# Patient Record
Sex: FEMALE | Race: ASIAN | NOT HISPANIC OR LATINO | Employment: PART TIME | URBAN - METROPOLITAN AREA
[De-identification: names, ages, dates, MRNs, and addresses within clinical notes are randomized per-mention and may not be internally consistent; named-entity substitution may affect disease eponyms.]

---

## 2019-12-09 ENCOUNTER — OFFICE VISIT (OUTPATIENT)
Dept: OPTOMETRY | Facility: CLINIC | Age: 64
End: 2019-12-09
Payer: COMMERCIAL

## 2019-12-09 DIAGNOSIS — Z96.1 PSEUDOPHAKIA: ICD-10-CM

## 2019-12-09 DIAGNOSIS — H25.12 NUCLEAR SCLEROTIC CATARACT OF LEFT EYE: Primary | ICD-10-CM

## 2019-12-09 PROCEDURE — 92002 INTRM OPH EXAM NEW PATIENT: CPT | Performed by: OPTOMETRIST

## 2019-12-09 ASSESSMENT — VISUAL ACUITY
OD_SC: 20/30
OS_SC: HM
OD_SC+: +2
METHOD: TUMBLING E 'S
OS_PH_SC: 20/400

## 2019-12-09 ASSESSMENT — SLIT LAMP EXAM - LIDS
COMMENTS: NORMAL
COMMENTS: NORMAL

## 2019-12-09 ASSESSMENT — EXTERNAL EXAM - RIGHT EYE: OD_EXAM: NORMAL

## 2019-12-09 ASSESSMENT — EXTERNAL EXAM - LEFT EYE: OS_EXAM: NORMAL

## 2019-12-09 ASSESSMENT — TONOMETRY
OS_IOP_MMHG: 16
IOP_METHOD: TONOPEN
OD_IOP_MMHG: 16

## 2019-12-09 ASSESSMENT — REFRACTION_MANIFEST: OD_SPHERE: NO READING

## 2019-12-09 NOTE — PROGRESS NOTES
Chief Complaint   Patient presents with     Cloudy Vision Left Eye     HPI    Cloudy Vision Left Eye      In left eye. Onset was gradual. This started 6 months ago. Severity is moderate. Occurring constantly. It is worse throughout the day. Context: distance vision and near vision. Since onset it is gradually worsening. Treatments tried include no treatments.       Cataract surgery right eye 03/2019.  In [place]    Iliana Kelley CPO    See Review Of Systems       Medical, surgical and family histories reviewed and updated 12/9/2019.         OBJECTIVE: See Ophthalmology exam    ASSESSMENT:    ICD-10-CM    1. Nuclear sclerotic cataract of left eye H25.12    2. Pseudophakia - Right Eye Z96.1     dense cataract unable to view post segment     PLAN:  Refer for CE OS  Cristy Mills OD

## 2019-12-09 NOTE — LETTER
12/9/2019         RE: Ping Guerrero  [address] [address] [facility] [address]        Dear Colleague,    Thank you for referring your patient, Ping Guerrero, to [facility]. Please see a copy of my visit note below.    Chief Complaint   Patient presents with     Cloudy Vision Left Eye     HPI    Cloudy Vision Left Eye      In left eye. Onset was gradual. This started 6 months ago. Severity is moderate. Occurring constantly. It is worse throughout the day. Context: distance vision and near vision. Since onset it is gradually worsening. Treatments tried include no treatments.       Cataract surgery right eye 03/2019.  In [place] CPO    See Review Of Systems       Medical, surgical and family histories reviewed and updated 12/9/2019.         OBJECTIVE: See Ophthalmology exam    ASSESSMENT:    ICD-10-CM    1. Nuclear sclerotic cataract of left eye H25.12    2. Pseudophakia - Right Eye Z96.1     dense cataract unable to view post segment     PLAN:  Refer for CE OS  Cristy Mills OD     Again, thank you for allowing me to participate in the care of your patient.        Sincerely,        Cristy Mills, OD

## 2020-01-13 ENCOUNTER — TELEPHONE (OUTPATIENT)
Dept: FAMILY MEDICINE | Facility: CLINIC | Age: 65
End: 2020-01-13

## 2020-01-13 ENCOUNTER — OFFICE VISIT (OUTPATIENT)
Dept: FAMILY MEDICINE | Facility: CLINIC | Age: 65
End: 2020-01-13
Payer: COMMERCIAL

## 2020-01-13 VITALS
SYSTOLIC BLOOD PRESSURE: 120 MMHG | TEMPERATURE: 98.4 F | DIASTOLIC BLOOD PRESSURE: 77 MMHG | BODY MASS INDEX: 27.42 KG/M2 | WEIGHT: 136 LBS | OXYGEN SATURATION: 95 % | HEIGHT: 59 IN | HEART RATE: 40 BPM

## 2020-01-13 DIAGNOSIS — H26.9 CATARACT OF LEFT EYE, UNSPECIFIED CATARACT TYPE: ICD-10-CM

## 2020-01-13 DIAGNOSIS — Z01.818 PREOP GENERAL PHYSICAL EXAM: Primary | ICD-10-CM

## 2020-01-13 PROCEDURE — 99204 OFFICE O/P NEW MOD 45 MIN: CPT | Performed by: FAMILY MEDICINE

## 2020-01-13 SDOH — HEALTH STABILITY: MENTAL HEALTH: HOW OFTEN DO YOU HAVE A DRINK CONTAINING ALCOHOL?: NEVER

## 2020-01-13 ASSESSMENT — MIFFLIN-ST. JEOR: SCORE: 1077.52

## 2020-01-13 NOTE — TELEPHONE ENCOUNTER
Pre op information was faxed to [facility] [place] for Dr. Foote.  Elpidio Ann CMA

## 2020-01-13 NOTE — PROGRESS NOTES
[facility]  [id number] [place] [id number]  [phone]  Dept: [phone]    PRE-OP EVALUATION:  Today's date: 2020    Ping Guerrero (: 1955) presents for pre-operative evaluation assessment as requested by Dr. Foote.  She requires evaluation and anesthesia risk assessment prior to undergoing surgery/procedure for treatment of left eye cataract .    Fax number for surgical facility: [facility]  Primary Physician: No Ref-Primary, Physician  Type of Anesthesia Anticipated: Local with MAC    Patient has a Health Care Directive or Living Will:  NO    Preop Questions 2020   Who is doing your surgery? Mario   What are you having done? Cataract   Date of Surgery/Procedure: 2020   Facility or Hospital where procedure/surgery will be performed: [facility]    1.  Do you have a history of Heart attack, stroke, stent, coronary bypass surgery, or other heart surgery? No   2.  Do you ever have any pain or discomfort in your chest? No   3.  Do you have a history of  Heart Failure? No   4.   Are you troubled by shortness of breath when:  walking on a level surface, or up a slight hill, or at night? No   5.  Do you currently have a cold, bronchitis or other respiratory infection? No   6.  Do you have a cough, shortness of breath, or wheezing? No   7.  Do you sometimes get pains in the calves of your legs when you walk? No   8. Do you or anyone in your family have previous history of blood clots? No   9.  Do you or does anyone in your family have a serious bleeding problem such as prolonged bleeding following surgeries or cuts? No   10. Have you ever had problems with anemia or been told to take iron pills? No   11. Have you had any abnormal blood loss such as black, tarry or bloody stools, or abnormal vaginal bleeding? No   12. Have you ever had a blood transfusion? No   13. Have you or any of your relatives ever had problems with anesthesia? No   14.  "Do you have sleep apnea, excessive snoring or daytime drowsiness? No   15. Do you have any prosthetic heart valves? No   16. Do you have prosthetic joints? No   17. Is there any chance that you may be pregnant? No         HPI:     HPI related to upcoming procedure: Left Eye cataract. Extraction deemed the next best possible management in care.   No acute complaints today.         See problem list for active medical problems.  Problems all longstanding and stable, except as noted/documented.  See ROS for pertinent symptoms related to these conditions.      MEDICAL HISTORY:   There are no active problems to display for this patient.     Past Medical History:   Diagnosis Date     Nonsenile cataract 03/2019    Right eye only     Past Surgical History:   Procedure Laterality Date     HC ECP WITH CATARACT SURGERY Right 03/15/2019    In [place]     No current outpatient medications on file.     OTC products: no recent use of OTC ASA, NSAIDS or Steroids    No Known Allergies   Latex Allergy: NO    Social History     Tobacco Use     Smoking status: Never Smoker     Smokeless tobacco: Never Used   Substance Use Topics     Alcohol use: Never     Frequency: Never     History   Drug Use Unknown       REVIEW OF SYSTEMS:   Constitutional, neuro, ENT, endocrine, pulmonary, cardiac, gastrointestinal, genitourinary, musculoskeletal, integument and psychiatric systems are negative, except as otherwise noted.    EXAM:   /77 (BP Location: Right arm, Patient Position: Chair, Cuff Size: Adult Regular)   Pulse (!) 40   Temp 98.4  F (36.9  C) (Oral)   Ht 1.499 m (4' 11\")   Wt 61.7 kg (136 lb)   SpO2 95%   BMI 27.47 kg/m      GENERAL APPEARANCE: healthy, alert and no distress     EYES: EOMI, PERRL     HENT: ear canals and TM's normal and nose and mouth without ulcers or lesions     NECK: no adenopathy, no asymmetry, masses, or scars and thyroid normal to palpation     RESP: lungs clear to auscultation - no rales, rhonchi or " wheezes     CV: regular rates and rhythm, normal S1 S2, no S3 or S4 and no murmur, click or rub     ABDOMEN:  soft, nontender, no HSM or masses and bowel sounds normal     MS: extremities normal- no gross deformities noted, no evidence of inflammation in joints, FROM in all extremities.     SKIN: no suspicious lesions or rashes     NEURO: Normal strength and tone, sensory exam grossly normal, mentation intact and speech normal     PSYCH: mentation appears normal. and affect normal/bright     LYMPHATICS: No cervical adenopathy    DIAGNOSTICS:   No labs or EKG required for low risk surgery (cataract, skin procedure, breast biopsy, etc)    No results for input(s): HGB, PLT, INR, NA, POTASSIUM, CR, A1C in the last 28376 hours.     IMPRESSION:   Reason for surgery/procedure: Left eye cataract   Diagnosis/reason for consult: Pre-operative consult     The proposed surgical procedure is considered LOW risk.    REVISED CARDIAC RISK INDEX  The patient has the following serious cardiovascular risks for perioperative complications such as (MI, PE, VFib and 3  AV Block):  No serious cardiac risks  INTERPRETATION: 0 risks: Class I (very low risk - 0.4% complication rate)    The patient has the following additional risks for perioperative complications:  No identified additional risks      ICD-10-CM    1. Preop general physical exam Z01.818    2. Cataract of left eye, unspecified cataract type H26.9        RECOMMENDATIONS:       APPROVAL GIVEN to proceed with proposed procedure, without further diagnostic evaluation       Signed Electronically by: Ashely Fregoso MD    Copy of this evaluation report is provided to requesting physician.    [place] Preop Guidelines    Revised Cardiac Risk Index

## 2020-01-13 NOTE — TELEPHONE ENCOUNTER
Reason for Call:  Form, our goal is to have forms completed with 72 hours, however, some forms may require a visit or additional information.    Type of letter, form or note:  Pre-Op Form    Who is the form from?: Patient    Where did the form come from: Patient or family brought in       What clinic location was the form placed at?: [facility]     Where the form was placed: Folder Box/Folder    What number is listed as a contact on the form?:[phone]        Additional comments: Daughter will  nce called to notify of completion    Call taken on 1/13/2020 at 2:33 PM by Jorge Luis Villar

## 2020-02-19 ENCOUNTER — TELEPHONE (OUTPATIENT)
Dept: FAMILY MEDICINE | Facility: CLINIC | Age: 65
End: 2020-02-19

## 2020-02-19 NOTE — TELEPHONE ENCOUNTER
Panel Management Review      Patient has the following on her problem list: None      Composite cancer screening  Chart review shows that this patient is due/due soon for the following Mammogram  Summary:    Patient is due/failing the following:   MAMMOGRAM    Action needed:   Patient needs office visit for Mammo.    Type of outreach:    none. pt having up coming appointment.    Questions for provider review:    None                                                                                                                                    Betty Jimenez MA  [facility].         Chart routed to none .

## 2020-06-12 ENCOUNTER — TELEPHONE (OUTPATIENT)
Dept: FAMILY MEDICINE | Facility: CLINIC | Age: 65
End: 2020-06-12

## 2020-06-12 NOTE — LETTER
[facility]  [address] [address] [facility] [address]  [phone]  June 12, 2020    Ping Guerrero  [address] [address] [address]    Dear Ping,    I care about your health and have reviewed your health plan. I have reviewed your medical conditions, medication list, and lab results and am making recommendations based on this review, to better manage your health.    You are in particular need of attention regarding:  -Breast Cancer Screening  -Colon Cancer Screening  -Cervical Cancer Screening    I am recommending that you:  {recommendations:-schedule a MAMMOGRAM which is due. We have mammogram available at our clinic; please call [phone].   Please disregard this reminder if you have had this exam elsewhere within the last year.  It would be helpful for us to have a copy of your mammogram report in our file so that we can best coordinate your care.  -schedule a COLONOSCOPY to look for colon cancer (due every 10 years or 5 years in higher risk situations.)   Colon cancer is now the second leading cause of death in the [place] for both men and women and there are over 130,000 new cases and 50,000 deaths per year from colon cancer.  Colonoscopies can prevent 90-95% of these deaths.  Problem lesions can be removed before they ever become cancer.  This test is not only looking for cancer, but also getting rid of precancerious lesions.  If you do not wish to do a colonoscopy or cannot afford to do one, at this time, there is another option. It is called a FIT test or Fecal Immunochemical Occult Blood Test (take home stool sample kit).  It does not replace the colonoscopy for colorectal cancer screening, but it can detect hidden bleeding in the lower colon.  It does need to be repeated every year and if a positive result is obtained, you would be referred for a colonoscopy.  If you have completed either one of these tests at another facility, please  have the records sent to our clinic so that we can best coordinate your care.  -schedule a PAP SMEAR EXAM which is due.  Please disregard this reminder if you have had this exam elsewhere within the last year.  It would be helpful for us to have a copy of your recent pap smear report in our file so that we can best coordinate your care.    Here is a list of Health Maintenance topics that are due now or due soon:  Health Maintenance Due   Topic Date Due     PREVENTIVE CARE VISIT  1955     HEPATITIS C SCREENING  1955     ANNUAL REVIEW OF HM ORDERS  1955     ADVANCE CARE PLANNING  1955     MAMMO SCREENING  1955     COLORECTAL CANCER SCREENING  11/09/1966     HIV SCREENING  11/09/1971     PAP  11/09/1977     DTAP/TDAP/TD IMMUNIZATION (1 - Tdap) 11/09/1981     LIPID  11/09/2001     PHQ-2  01/01/2020     ZOSTER IMMUNIZATION (2 of 2) 04/06/2020       Please call us at [phone] (or use [employer]) to address the above recommendations.     Thank you for trusting [facility] and we appreciate the opportunity to serve you.  We look forward to supporting your healthcare needs in the future.    Healthy Regards,    Ashely Fregoso MD/lf

## 2020-06-12 NOTE — TELEPHONE ENCOUNTER
Summary:    Patient is due/failing the following:   MAMMOGRAM    Reviewed:  [x] CARE EVERYWHERE  [x] LAST OV NOTE INCLUDING ENDO  [x] FYI TAB  [x] MYCHART ACTIVE?  [x] LAST PANEL ENCOUNTER  [x] FUTURE APPTS  [x] IMMUNIZATIONS          Action needed:   mammo    Type of outreach:    Sent letter.                                                                               Toshia Smith/CHANTELLE  Gepp---[facility]

## 2020-09-14 ENCOUNTER — HOSPITAL ENCOUNTER (OUTPATIENT)
Dept: MAMMOGRAPHY | Facility: CLINIC | Age: 65
Discharge: HOME | End: 2020-09-14
Attending: FAMILY MEDICINE | Admitting: FAMILY MEDICINE
Payer: COMMERCIAL

## 2020-09-14 PROCEDURE — 77067 SCR MAMMO BI INCL CAD: CPT

## 2020-09-21 ENCOUNTER — HOSPITAL ENCOUNTER (OUTPATIENT)
Dept: ULTRASOUND IMAGING | Facility: CLINIC | Age: 65
End: 2020-09-21
Attending: FAMILY MEDICINE
Payer: COMMERCIAL

## 2020-09-21 ENCOUNTER — HOSPITAL ENCOUNTER (OUTPATIENT)
Dept: MAMMOGRAPHY | Facility: CLINIC | Age: 65
End: 2020-09-21
Attending: FAMILY MEDICINE
Payer: COMMERCIAL

## 2020-09-21 DIAGNOSIS — R92.8 ABNORMAL MAMMOGRAM: ICD-10-CM

## 2020-09-21 PROCEDURE — 76642 ULTRASOUND BREAST LIMITED: CPT | Mod: RT

## 2020-09-21 PROCEDURE — G0279 TOMOSYNTHESIS, MAMMO: HCPCS

## 2020-09-24 ENCOUNTER — OFFICE VISIT (OUTPATIENT)
Dept: FAMILY MEDICINE | Facility: CLINIC | Age: 65
End: 2020-09-24
Payer: COMMERCIAL

## 2020-09-24 VITALS
SYSTOLIC BLOOD PRESSURE: 118 MMHG | BODY MASS INDEX: 28.27 KG/M2 | RESPIRATION RATE: 16 BRPM | OXYGEN SATURATION: 96 % | HEART RATE: 74 BPM | HEIGHT: 60 IN | TEMPERATURE: 98.2 F | DIASTOLIC BLOOD PRESSURE: 78 MMHG | WEIGHT: 144 LBS

## 2020-09-24 DIAGNOSIS — Z12.4 SCREENING FOR MALIGNANT NEOPLASM OF CERVIX: ICD-10-CM

## 2020-09-24 DIAGNOSIS — R82.90 ABNORMAL URINE ODOR: ICD-10-CM

## 2020-09-24 DIAGNOSIS — Z00.00 ROUTINE GENERAL MEDICAL EXAMINATION AT A HEALTH CARE FACILITY: Primary | ICD-10-CM

## 2020-09-24 DIAGNOSIS — Z13.6 CARDIOVASCULAR SCREENING; LDL GOAL LESS THAN 160: ICD-10-CM

## 2020-09-24 DIAGNOSIS — R82.90 NONSPECIFIC FINDING ON EXAMINATION OF URINE: ICD-10-CM

## 2020-09-24 DIAGNOSIS — Z11.59 NEED FOR HEPATITIS C SCREENING TEST: ICD-10-CM

## 2020-09-24 DIAGNOSIS — Z23 NEED FOR PROPHYLACTIC VACCINATION AND INOCULATION AGAINST INFLUENZA: ICD-10-CM

## 2020-09-24 DIAGNOSIS — K08.89 TOOTH PAIN: ICD-10-CM

## 2020-09-24 DIAGNOSIS — Z12.11 SPECIAL SCREENING FOR MALIGNANT NEOPLASMS, COLON: ICD-10-CM

## 2020-09-24 LAB
ALBUMIN UR-MCNC: ABNORMAL MG/DL
APPEARANCE UR: CLEAR
BACTERIA #/AREA URNS HPF: ABNORMAL /HPF
BILIRUB UR QL STRIP: NEGATIVE
COLOR UR AUTO: YELLOW
GLUCOSE UR STRIP-MCNC: NEGATIVE MG/DL
HCV AB SERPL QL IA: NONREACTIVE
HGB UR QL STRIP: ABNORMAL
KETONES UR STRIP-MCNC: NEGATIVE MG/DL
LEUKOCYTE ESTERASE UR QL STRIP: ABNORMAL
MUCOUS THREADS #/AREA URNS LPF: PRESENT /LPF
NITRATE UR QL: NEGATIVE
NON-SQ EPI CELLS #/AREA URNS LPF: ABNORMAL /LPF
PH UR STRIP: 6 PH (ref 5–7)
RBC #/AREA URNS AUTO: ABNORMAL /HPF
SOURCE: ABNORMAL
SP GR UR STRIP: 1.02 (ref 1–1.03)
UROBILINOGEN UR STRIP-ACNC: 0.2 EU/DL (ref 0.2–1)
WBC #/AREA URNS AUTO: ABNORMAL /HPF

## 2020-09-24 PROCEDURE — 81001 URINALYSIS AUTO W/SCOPE: CPT | Performed by: NURSE PRACTITIONER

## 2020-09-24 PROCEDURE — 80053 COMPREHEN METABOLIC PANEL: CPT | Performed by: NURSE PRACTITIONER

## 2020-09-24 PROCEDURE — 99213 OFFICE O/P EST LOW 20 MIN: CPT | Mod: 25 | Performed by: NURSE PRACTITIONER

## 2020-09-24 PROCEDURE — 90472 IMMUNIZATION ADMIN EACH ADD: CPT | Performed by: NURSE PRACTITIONER

## 2020-09-24 PROCEDURE — 87086 URINE CULTURE/COLONY COUNT: CPT | Performed by: NURSE PRACTITIONER

## 2020-09-24 PROCEDURE — 87088 URINE BACTERIA CULTURE: CPT | Performed by: NURSE PRACTITIONER

## 2020-09-24 PROCEDURE — 87186 SC STD MICRODIL/AGAR DIL: CPT | Performed by: NURSE PRACTITIONER

## 2020-09-24 PROCEDURE — 99396 PREV VISIT EST AGE 40-64: CPT | Mod: 25 | Performed by: NURSE PRACTITIONER

## 2020-09-24 PROCEDURE — 86803 HEPATITIS C AB TEST: CPT | Performed by: NURSE PRACTITIONER

## 2020-09-24 PROCEDURE — 80061 LIPID PANEL: CPT | Performed by: NURSE PRACTITIONER

## 2020-09-24 PROCEDURE — 90715 TDAP VACCINE 7 YRS/> IM: CPT | Performed by: NURSE PRACTITIONER

## 2020-09-24 PROCEDURE — 90471 IMMUNIZATION ADMIN: CPT | Performed by: NURSE PRACTITIONER

## 2020-09-24 PROCEDURE — 36415 COLL VENOUS BLD VENIPUNCTURE: CPT | Performed by: NURSE PRACTITIONER

## 2020-09-24 PROCEDURE — G0145 SCR C/V CYTO,THINLAYER,RESCR: HCPCS | Performed by: NURSE PRACTITIONER

## 2020-09-24 PROCEDURE — 90682 RIV4 VACC RECOMBINANT DNA IM: CPT | Performed by: NURSE PRACTITIONER

## 2020-09-24 PROCEDURE — G0476 HPV COMBO ASSAY CA SCREEN: HCPCS | Performed by: NURSE PRACTITIONER

## 2020-09-24 ASSESSMENT — ENCOUNTER SYMPTOMS
HEMATOCHEZIA: 0
HEADACHES: 0
SHORTNESS OF BREATH: 0
PARESTHESIAS: 0
DIZZINESS: 1
CHILLS: 0
COUGH: 0
DIARRHEA: 0
DYSURIA: 0
HEMATURIA: 0
PALPITATIONS: 0
NAUSEA: 0
BREAST MASS: 0
WEAKNESS: 0
ARTHRALGIAS: 0
JOINT SWELLING: 0
MYALGIAS: 0
CONSTIPATION: 0
EYE PAIN: 0
SORE THROAT: 0
FREQUENCY: 0
FEVER: 0
HEARTBURN: 0
ABDOMINAL PAIN: 0
NERVOUS/ANXIOUS: 0

## 2020-09-24 ASSESSMENT — MIFFLIN-ST. JEOR: SCORE: 1121.74

## 2020-09-24 NOTE — PROGRESS NOTES
SUBJECTIVE:   CC: Ping Guerrero is an 64 year old woman who presents for preventive health visit.         Patient has been advised of split billing requirements and indicates understanding: Yes  Healthy Habits:     Getting at least 3 servings of Calcium per day:  NO    Bi-annual eye exam:  NO    Dental care twice a year:  NO    Sleep apnea or symptoms of sleep apnea:  None    Diet:  Regular (no restrictions)    Frequency of exercise:  None    Taking medications regularly:  Yes    Medication side effects:  None    PHQ-2 Total Score: 0    Additional concerns today:  No    Ability to successfully perform activities of daily living: Yes, no assistance needed  Home safety:  none identified   Hearing impairment: No          Today's PHQ-2 Score:   PHQ-2 ( 1999 [employer]) 9/24/2020   Q1: Little interest or pleasure in doing things 0   Q2: Feeling down, depressed or hopeless 0   PHQ-2 Score 0   Q1: Little interest or pleasure in doing things Not at all   Q2: Feeling down, depressed or hopeless Not at all   PHQ-2 Score 0       Abuse: Current or Past (Physical, Sexual or Emotional) - No  Do you feel safe in your environment? Yes    Have you ever done Advance Care Planning? (For example, a Health Directive, POLST, or a discussion with a medical provider or your loved ones about your wishes): No, advance care planning information given to patient to review.  Patient declined advance care planning discussion at this time.    Social History     Tobacco Use     Smoking status: Never Smoker     Smokeless tobacco: Never Used   Substance Use Topics     Alcohol use: Never     Frequency: Never         Alcohol Use 9/24/2020   Prescreen: >3 drinks/day or >7 drinks/week? Not Applicable     HPI  Here for physical   Wants to get up to date on routine care.  Mandarin  via telephone utilized.   Patient moved here from [place] May 2019.   Has not been seen medically for over 30 years, per pt.   Eating and drinking okay.   No changes in  bowels.   Has never had a colonoscopy.  Lives with her daughter.  Daughter states her urine has a foul odor at times.   Daughter states she does not exercise.       Reviewed orders with patient.  Reviewed health maintenance and updated orders accordingly - Yes  Lab work is in process    Mammogram Screening: Patient over age 50, mutual decision to screen reflected in health maintenance.    Pertinent mammograms are reviewed under the imaging tab.  History of abnormal Pap smear: NO - age 30- 65 PAP every 5 years recommended     Reviewed and updated as needed this visit by clinical staff  Tobacco  Allergies  Med Hx  Surg Hx  Fam Hx  Soc Hx        Reviewed and updated as needed this visit by Provider        Past Medical History:   Diagnosis Date     Nonsenile cataract 03/2019    Right eye only      Past Surgical History:   Procedure Laterality Date     HC ECP WITH CATARACT SURGERY Right 03/15/2019    In [place]       Review of Systems   Constitutional: Negative for chills and fever.   HENT: Negative for congestion, ear pain, hearing loss and sore throat.    Eyes: Negative for pain and visual disturbance.   Respiratory: Negative for cough and shortness of breath.    Cardiovascular: Negative for chest pain, palpitations and peripheral edema.   Gastrointestinal: Negative for abdominal pain, constipation, diarrhea, heartburn, hematochezia and nausea.   Breasts:  Negative for tenderness, breast mass and discharge.   Genitourinary: Negative for dysuria, frequency, genital sores, hematuria, pelvic pain, urgency, vaginal bleeding and vaginal discharge.   Musculoskeletal: Negative for arthralgias, joint swelling and myalgias.   Skin: Negative for rash.   Neurological: Positive for dizziness. Negative for weakness, headaches and paresthesias.   Psychiatric/Behavioral: Negative for mood changes. The patient is not nervous/anxious.           OBJECTIVE:   /78   Pulse 74   Temp 98.2  F (36.8  C) (Oral)   Resp 16   Ht  "1.511 m (4' 11.5\")   Wt 65.3 kg (144 lb)   SpO2 96%   BMI 28.60 kg/m    Physical Exam  GENERAL APPEARANCE: healthy, alert and no distress  EYES: Eyes grossly normal to inspection, PERRL and conjunctivae and sclerae normal  HENT: ear canals and TM's normal, nose and mouth without ulcers or lesions, oropharynx clear and oral mucous membranes moist  NECK: no adenopathy, no asymmetry, masses, or scars and thyroid normal to palpation  RESP: lungs clear to auscultation - no rales, rhonchi or wheezes  BREAST: normal without masses, tenderness or nipple discharge and no palpable axillary masses or adenopathy  CV: regular rate and rhythm, normal S1 S2, no S3 or S4, no murmur, click or rub, no peripheral edema and peripheral pulses strong  ABDOMEN: soft, nontender, no hepatosplenomegaly, no masses and bowel sounds normal  :  Normal external genitalia, cervix.  NEURO: Normal strength and tone, sensory exam grossly normal, mentation intact and speech normal  PSYCH: mentation appears normal and affect normal/bright    Diagnostic Test Results:  Labs reviewed in Epic    ASSESSMENT/PLAN:   1. Routine general medical examination at a health care facility  Updated pap smear.  Will plan on 3 normal pap smears prior to discontinuing.      2. Special screening for malignant neoplasms, colon  Patient agrees and understands the plan for colonoscopy.   - GASTROENTEROLOGY ADULT REF PROCEDURE ONLY; Future    3. CARDIOVASCULAR SCREENING; LDL GOAL LESS THAN 160  - Comprehensive metabolic panel  - Lipid panel reflex to direct LDL Fasting    4. Screening for malignant neoplasm of cervix  Has not had a pap in the last 30 years.    - Pap imaged thin layer screen with HPV - recommended age 30 - 65 years (select HPV order below)  - HPV High Risk Types DNA Cervical    5. Need for hepatitis C screening test  - **Hepatitis C Screen Reflex to RNA FUTURE anytime    6. Abnormal urine odor  Per daughter, urine has had an odor over the last year. Would " "like to check urine today.   - *UA reflex to Microscopic and Culture ([place] [facility] (except [place] and [place])    7. Need for prophylactic vaccination and inoculation against influenza  - INFLUENZA QUAD, RECOMBINANT, P-FREE (RIV4) (FLUBLOCK) [51640]  - Vaccine Administration, Initial [47493]    Patient has been advised of split billing requirements and indicates understanding: Yes  COUNSELING:  Reviewed preventive health counseling, as reflected in patient instructions       Regular exercise       Dental care    Estimated body mass index is 28.6 kg/m  as calculated from the following:    Height as of this encounter: 1.511 m (4' 11.5\").    Weight as of this encounter: 65.3 kg (144 lb).    Weight management plan: Discussed healthy diet and exercise guidelines    She reports that she has never smoked. She has never used smokeless tobacco.      Counseling Resources:  ATP IV Guidelines  Pooled Cohorts Equation Calculator  Breast Cancer Risk Calculator  BRCA-Related Cancer Risk Assessment: FHS-7 Tool  FRAX Risk Assessment  ICSI Preventive Guidelines  Dietary Guidelines for Americans, 2010  USDA's MyPlate  ASA Prophylaxis  Lung CA Screening    MIRNA Rushing Ra, CNP  [facility] [place]  "

## 2020-09-24 NOTE — LETTER
October 8, 2020      [place]ol[place] Guerrero  [address] [address] [address]        Dear ,    We are happy to inform you that your recent Pap smear and Human Papillomavirus (HPV) test results are normal and negative.    It is recommended that you have your next Pap smear in 3 years. You will also need to return to the clinic every year for an annual wellness visit.    If you have additional questions regarding this result, please contact our office and we will be happy to assist you.      Sincerely,    Your [facility] Care Team

## 2020-09-24 NOTE — PATIENT INSTRUCTIONS

## 2020-09-25 ENCOUNTER — PATIENT OUTREACH (OUTPATIENT)
Dept: CARE COORDINATION | Facility: CLINIC | Age: 65
End: 2020-09-25

## 2020-09-25 LAB
ALBUMIN SERPL-MCNC: 3.7 G/DL (ref 3.4–5)
ALP SERPL-CCNC: 74 U/L (ref 40–150)
ALT SERPL W P-5'-P-CCNC: 12 U/L (ref 0–50)
ANION GAP SERPL CALCULATED.3IONS-SCNC: 10 MMOL/L (ref 3–14)
AST SERPL W P-5'-P-CCNC: 11 U/L (ref 0–45)
BACTERIA SPEC CULT: ABNORMAL
BILIRUB SERPL-MCNC: 1.3 MG/DL (ref 0.2–1.3)
BUN SERPL-MCNC: 16 MG/DL (ref 7–30)
CALCIUM SERPL-MCNC: 8.7 MG/DL (ref 8.5–10.1)
CHLORIDE SERPL-SCNC: 110 MMOL/L (ref 94–109)
CHOLEST SERPL-MCNC: 164 MG/DL
CO2 SERPL-SCNC: 23 MMOL/L (ref 20–32)
CREAT SERPL-MCNC: 0.68 MG/DL (ref 0.52–1.04)
GFR SERPL CREATININE-BSD FRML MDRD: >90 ML/MIN/{1.73_M2}
GLUCOSE SERPL-MCNC: 91 MG/DL (ref 70–99)
HDLC SERPL-MCNC: 51 MG/DL
LDLC SERPL CALC-MCNC: 80 MG/DL
NONHDLC SERPL-MCNC: 113 MG/DL
POTASSIUM SERPL-SCNC: 4 MMOL/L (ref 3.4–5.3)
PROT SERPL-MCNC: 8 G/DL (ref 6.8–8.8)
SODIUM SERPL-SCNC: 143 MMOL/L (ref 133–144)
SPECIMEN SOURCE: ABNORMAL
TRIGL SERPL-MCNC: 163 MG/DL

## 2020-09-26 DIAGNOSIS — N30.00 ACUTE CYSTITIS WITHOUT HEMATURIA: Primary | ICD-10-CM

## 2020-09-26 RX ORDER — SULFAMETHOXAZOLE/TRIMETHOPRIM 800-160 MG
1 TABLET ORAL 2 TIMES DAILY
Qty: 6 TABLET | Refills: 0 | Status: SHIPPED | OUTPATIENT
Start: 2020-09-26 | End: 2022-02-28

## 2020-09-27 LAB
COPATH REPORT: NORMAL
PAP: NORMAL

## 2020-09-30 LAB
FINAL DIAGNOSIS: NORMAL
HPV HR 12 DNA CVX QL NAA+PROBE: NEGATIVE
HPV16 DNA SPEC QL NAA+PROBE: NEGATIVE
HPV18 DNA SPEC QL NAA+PROBE: NEGATIVE
SPECIMEN DESCRIPTION: NORMAL
SPECIMEN SOURCE CVX/VAG CYTO: NORMAL

## 2020-10-13 ENCOUNTER — PATIENT OUTREACH (OUTPATIENT)
Dept: NURSING | Facility: CLINIC | Age: 65
End: 2020-10-13
Payer: COMMERCIAL

## 2020-10-13 SDOH — ECONOMIC STABILITY: TRANSPORTATION INSECURITY
IN THE PAST 12 MONTHS, HAS THE LACK OF TRANSPORTATION KEPT YOU FROM MEDICAL APPOINTMENTS OR FROM GETTING MEDICATIONS?: NO

## 2020-10-13 SDOH — HEALTH STABILITY: PHYSICAL HEALTH: ON AVERAGE, HOW MANY MINUTES DO YOU ENGAGE IN EXERCISE AT THIS LEVEL?: 0 MIN

## 2020-10-13 SDOH — ECONOMIC STABILITY: FOOD INSECURITY: WITHIN THE PAST 12 MONTHS, THE FOOD YOU BOUGHT JUST DIDN'T LAST AND YOU DIDN'T HAVE MONEY TO GET MORE.: NEVER TRUE

## 2020-10-13 SDOH — ECONOMIC STABILITY: INCOME INSECURITY: HOW HARD IS IT FOR YOU TO PAY FOR THE VERY BASICS LIKE FOOD, HOUSING, MEDICAL CARE, AND HEATING?: NOT HARD AT ALL

## 2020-10-13 SDOH — ECONOMIC STABILITY: FOOD INSECURITY: WITHIN THE PAST 12 MONTHS, YOU WORRIED THAT YOUR FOOD WOULD RUN OUT BEFORE YOU GOT MONEY TO BUY MORE.: NEVER TRUE

## 2020-10-13 SDOH — HEALTH STABILITY: PHYSICAL HEALTH: ON AVERAGE, HOW MANY DAYS PER WEEK DO YOU ENGAGE IN MODERATE TO STRENUOUS EXERCISE (LIKE A BRISK WALK)?: 0 DAYS

## 2020-10-13 ASSESSMENT — ACTIVITIES OF DAILY LIVING (ADL): DEPENDENT_IADLS:: INDEPENDENT

## 2020-10-13 NOTE — PROGRESS NOTES
Clinic Care Coordination Contact    Clinic Care Coordination Contact  OUTREACH    Referral Information:  Referral Source: PCP    Primary Diagnosis: Other (include Comment box)    Chief Complaint   Patient presents with     Clinic Care Coordination - Initial     Dental resources        Universal Utilization: 20 % Admission or ED Risk  Clinic Utilization  Difficulty keeping appointments:: No  Compliance Concerns: No  No-Show Concerns: No  No PCP office visit in Past Year: No  Utilization    Last refreshed: 10/11/2020  4:37 PM: Hospital Admissions 0           Last refreshed: 10/11/2020  4:37 PM: ED Visits 0           Last refreshed: 10/11/2020  4:37 PM: No Show Count (past year) 1              Current as of: 10/11/2020  4:37 PM              Clinical Concerns:  Current Medical Concerns:    Referral made to assist Duc olivia find a dental provider.  The following assessment plan was made at her PCP visit on 9/24/20:     ASSES   1. Routine general medical examination at a health care facility  Updated pap smear.  Will plan on 3 normal pap smears prior to discontinuing.       2. Special screening for malignant neoplasms, colon  Patient agrees and understands the plan for colonoscopy.   - GASTROENTEROLOGY ADULT REF PROCEDURE ONLY; Future     3. CARDIOVASCULAR SCREENING; LDL GOAL LESS THAN 160  - Comprehensive metabolic panel  - Lipid panel reflex to direct LDL Fasting     4. Screening for malignant neoplasm of cervix  Has not had a pap in the last 30 years.    - Pap imaged thin layer screen with HPV - recommended age 30 - 65 years (select HPV order below)  - HPV High Risk Types DNA Cervical     5. Need for hepatitis C screening test  - **Hepatitis C Screen Reflex to RNA FUTURE anytime     6. Abnormal urine odor  Per daughter, urine has had an odor over the last year. Would like to check urine today.   - *UA reflex to Microscopic and Culture ([facility] (except [place] and [place])     7. Need for  prophylactic vaccination and inoculation against influenza  - INFLUENZA QUAD, RECOMBINANT, P-FREE (RIV4) (FLUBLOCK) [51899]  - Vaccine Administration, Initial [55956]    [facility] CC spoke with Ping's  daughter  Kaushik fuentes by phone.  She stated that her mom is fine and doesn't need anything currently.  They have decided to wait to seek dental care until after the new year.  Declined dental resources or any other resources at this time.      Current Behavioral Concerns:  NA   Education Provided to patient: CC role,     Pain  Pain (GOAL):: No  Health Maintenance Reviewed: Due/Overdue   IV SCREENING  ZOSTER IMMUNIZATION (2 of 2)  PAP FOLLOW-UP  HPV FOLLOW-UP    Clinical Pathway: None    Medication Management:  No concerns reported     Functional Status:  Dependent ADLs:: Independent  Dependent IADLs:: Independent  Bed or wheelchair confined:: No  Mobility Status: Independent    Living Situation:  Current living arrangement:: I live in a private home with family  Type of residence:: Private home - stairs    Lifestyle & Psychosocial Needs:  Lifestyle     Physical activity     Days per week: 0 days     Minutes per session: 0 min     Stress: Not on file     Social Needs     Financial resource strain: Not hard at all     Food insecurity     Worry: Never true     Inability: Never true     Transportation needs     Medical: No     Non-medical: Not on file     Inadequate nutrition (GOAL):: No  Tube Feeding: No  Significant changes in sleep pattern (GOAL): No  Transportation means:: Family     Baptism or spiritual beliefs that impact treatment:: No  Mental health DX:: No  Mental health management concern (GOAL):: No  Informal Support system:: Family   Socioeconomic History     Marital status: Single     Spouse name: Not on file     Number of children: Not on file     Years of education: Not on file     Highest education level: Not on file     Tobacco Use     Smoking status: Never Smoker     Smokeless tobacco: Never Used   Substance  and Sexual Activity     Alcohol use: Never     Frequency: Never     Drug use: Never     Sexual activity: Not Currently             Resources and Interventions:  Current Resources: NA      Community Resources: None  Supplies used at home:: None  Equipment Currently Used at Home: none        Advance Care Plan/Directive  Advanced Care Plans/Directives on file:: No  Advanced Care Plan/Directive Status: Declined Further Information    Referrals Placed: None     Goals: NA      Patient/Caregiver understanding: Daughter expressed understanding           Plan: Patient's daughter agreed to contact this writer if she needs dental resources or any other resources for patient. MARCOS CC provided her contact information and will also mail it to her.  No further outreaches will be made at this time.    LAMBERTO Paz Care Coordination Team  [phone]

## 2020-10-13 NOTE — LETTER
[place] CARE COORDINATION  [facility]   October 13, 2020    Ping Guerrero  [address] [address] [address]      Dear Ping,    I am a clinic care coordinator who works with Ashely Freogso MD at [facility]  I wanted to thank you for spending the time to talk with me.  Below is a description of clinic care coordination and how I can further assist you.      The clinic care coordination team is made up of a registered nurse,  and community health worker who understand the health care system. The goal of clinic care coordination is to help you manage your health and improve access to the health care system in the most efficient manner. The team can assist you in meeting your health care goals by providing education, coordinating services, strengthening the communication among your providers and supporting you with any resource needs.    Please feel free to contact me at [phone] with any questions or concerns. We are focused on providing you with the highest-quality healthcare experience possible and that all starts with you.     Sincerely,     LAMBERTO Paz   Care Coordination Team  [phone]

## 2021-02-22 ENCOUNTER — OFFICE VISIT (OUTPATIENT)
Dept: OPTOMETRY | Facility: CLINIC | Age: 66
End: 2021-02-22
Payer: COMMERCIAL

## 2021-02-22 DIAGNOSIS — Z96.1 PSEUDOPHAKIA: ICD-10-CM

## 2021-02-22 DIAGNOSIS — H10.13 ALLERGIC CONJUNCTIVITIS, BILATERAL: Primary | ICD-10-CM

## 2021-02-22 PROCEDURE — 99203 OFFICE O/P NEW LOW 30 MIN: CPT | Performed by: OPTOMETRIST

## 2021-02-22 ASSESSMENT — VISUAL ACUITY
OD_PH_SC: 20/30
METHOD: NUMBERS - LINEAR
OD_SC: 20/40

## 2021-02-22 ASSESSMENT — EXTERNAL EXAM - LEFT EYE: OS_EXAM: NORMAL

## 2021-02-22 ASSESSMENT — EXTERNAL EXAM - RIGHT EYE: OD_EXAM: NORMAL

## 2021-02-22 ASSESSMENT — CUP TO DISC RATIO: OS_RATIO: 0.2

## 2021-02-22 NOTE — PROGRESS NOTES
Chief Complaint   Patient presents with     Eye Problem Right Eye       Do you wear contact lenses? No    Had cataract surgery, and due to pandeminc didn't follow up  Right eye is red, swollen and itchy.   Unsure if there are other symptoms.  Ongoing for one week.  Noticing discharge.  Has not tried home remedies, patient denies pain.      Accompanied by daughter, and using phone  service.    Iliana Kelley CPO    See Review Of Systems     All symptoms are improving  No known allergens  No new creams/ lotions, or products    Medical, surgical and family histories reviewed and updated 2/22/2021.         OBJECTIVE: See Ophthalmology exam    ASSESSMENT:    ICD-10-CM    1. Allergic conjunctivitis, bilateral  H10.13 ketotifen (ALAWAY) 0.025 % ophthalmic solution   2. Pseudophakia - Both Eyes  Z96.1     R>L  IOL looks good vision much improved, little to no RE       PLAN:  Cold compresses  Add antihistamine drops as needed two times daily     She could use over the counter artificial tears too if needed  Continue w/ readers, schedule CE in future     Cristy Mills OD

## 2021-02-22 NOTE — PATIENT INSTRUCTIONS
Using over the counter Zaditor or Alaway- 1 drop in both eyes 2 x day or Pataday once daily   along with cold compresses and frequent eye/ brow washing will help for itchy, watery eyes.   Using continuously for 2 weeks will have better efficacy    You may also use chilled artificial tears during the day two times daily         Implant looks good after cataract surgery    DRY EYE TREATMENT    I recommend using artificial tears for your dry eye. There are over the counter drops that work well and may be used up to 4x daily. ( systane balance or ultra, blink, refresh optive, soothe xp) stay away from any red eye relief products such as visine and clear eyes.     If you need more than 4 drops daily, use a preservative free product which come in individual vials and may be used for 24 hours and discarded.   The more severe the dry eye, the more frequent instillation of artificial tears  that are needed to reduce irritation/ inflammation. (Sometimes every 1-2 hours for a couple of days).  You can also add a lubricating ointment in the lower lid at bedtime. ( over the counter refresh pm, genteal gel, lacrilube etc.)    Artificial tears work best as a preventative and not as well after your eyes are starting to bother you and/ or are red.  It may take 4- 6 weeks of using the drops before you notice improvement.  If after that time you are still having problems schedule an appointment for an evaluation to discuss different treatments.    Dry eyes are a chronic condition and you may have more symptoms at certain times of the year.      Additional recommended treatment:  Warm compresses once to twice daily for 5-10 minutes    Directions for warm soaks  There are few methods for hot compresses. Moisten a washcloth with hot water, or microwave for 10 seconds, being careful to not get the cloth too hot.   Then put the washcloth onto your eyelids for 5 minutes. It will cool quickly so a rice pack or eyemask that can be heated and  laid on top of the washcloth will help retain the heat.      Overabundance of bacterial microorganisms along the eyelashes and lid margins induce stress on the tear film and promote inflammation.  Regular lid hygiene helps diminish the bacterial population to prevent inflammation and infection.  Use a warm compress to loosen crusts   Cleanse lids once daily with a lid cleansing product as directed such as Ocusoft or Sterilid which can be purchased at most pharmacies. Diluted baby shampoo will also work, but not as well as it dries the skin and can irritate eyelids.  Hypo chlor spray may also be used on closed lids.      Omega 3 fatty acid supplements taken 1-2x daily  Recommend  at least  2000mg omega 3  800 EPA  600 DHA    Blink regularly  Stay hydrated  Increase humidity  Wear sunglasses   Avoid direct exposure to forced air--turn air vents in the car away and keep fans from blowing directly on your face

## 2021-02-22 NOTE — LETTER
2/22/2021         RE: Ping Guerrero  [address] [address] [address]        Dear Colleague,    Thank you for referring your patient, Ping Guerrero, to the [facility]. Please see a copy of my visit note below.    Chief Complaint   Patient presents with     Eye Problem Right Eye       Do you wear contact lenses? No    Had cataract surgery, and due to pandeminc didn't follow up  Right eye is red, swollen and itchy.   Unsure if there are other symptoms.  Ongoing for one week.  Noticing discharge.  Has not tried home remedies, patient denies pain.      Accompanied by daughter, and using phone  service.    Iliana Kelley CPO    See Review Of Systems     All symptoms are improving  No known allergens  No new creams/ lotions, or products    Medical, surgical and family histories reviewed and updated 2/22/2021.         OBJECTIVE: See Ophthalmology exam    ASSESSMENT:    ICD-10-CM    1. Allergic conjunctivitis, bilateral  H10.13 ketotifen (ALAWAY) 0.025 % ophthalmic solution   2. Pseudophakia - Both Eyes  Z96.1     R>L  IOL looks good vision much improved, little to no RE       PLAN:  Cold compresses  Add antihistamine drops as needed two times daily     She could use over the counter artificial tears too if needed  Continue w/ readers, schedule CE in future     Cristy Mills OD       Again, thank you for allowing me to participate in the care of your patient.        Sincerely,        Cristy Mills, OD

## 2021-09-27 ENCOUNTER — ANCILLARY PROCEDURE (OUTPATIENT)
Dept: MAMMOGRAPHY | Facility: CLINIC | Age: 66
End: 2021-09-27
Attending: FAMILY MEDICINE
Payer: COMMERCIAL

## 2021-09-27 DIAGNOSIS — Z12.31 VISIT FOR SCREENING MAMMOGRAM: ICD-10-CM

## 2021-09-27 PROCEDURE — 77067 SCR MAMMO BI INCL CAD: CPT | Mod: TC | Performed by: RADIOLOGY

## 2021-12-21 ENCOUNTER — IMMUNIZATION (OUTPATIENT)
Dept: NURSING | Facility: CLINIC | Age: 66
End: 2021-12-21
Payer: COMMERCIAL

## 2021-12-21 DIAGNOSIS — Z23 HIGH PRIORITY FOR 2019-NCOV VACCINE: Primary | ICD-10-CM

## 2021-12-21 PROCEDURE — 91300 COVID-19,PF,PFIZER (12+ YRS): CPT

## 2021-12-21 PROCEDURE — 99207 PR NO CHARGE LOS: CPT

## 2021-12-21 PROCEDURE — 0004A COVID-19,PF,PFIZER (12+ YRS): CPT

## 2022-01-28 ENCOUNTER — HOSPITAL ENCOUNTER (EMERGENCY)
Facility: CLINIC | Age: 67
Discharge: HOME | End: 2022-01-28
Attending: EMERGENCY MEDICINE | Admitting: EMERGENCY MEDICINE
Payer: COMMERCIAL

## 2022-01-28 ENCOUNTER — TELEPHONE (OUTPATIENT)
Dept: NURSING | Facility: CLINIC | Age: 67
End: 2022-01-28

## 2022-01-28 VITALS
SYSTOLIC BLOOD PRESSURE: 124 MMHG | RESPIRATION RATE: 10 BRPM | TEMPERATURE: 98.2 F | BODY MASS INDEX: 27.88 KG/M2 | DIASTOLIC BLOOD PRESSURE: 79 MMHG | OXYGEN SATURATION: 98 % | HEART RATE: 59 BPM | WEIGHT: 142 LBS | HEIGHT: 60 IN

## 2022-01-28 DIAGNOSIS — U07.1 INFECTION DUE TO 2019 NOVEL CORONAVIRUS: ICD-10-CM

## 2022-01-28 DIAGNOSIS — R94.31 ABNORMAL ELECTROCARDIOGRAM: ICD-10-CM

## 2022-01-28 LAB
ANION GAP SERPL CALCULATED.3IONS-SCNC: 7 MMOL/L (ref 3–14)
ATRIAL RATE - MUSE: 71 BPM
BASOPHILS # BLD AUTO: 0 10E3/UL (ref 0–0.2)
BASOPHILS NFR BLD AUTO: 0 %
BUN SERPL-MCNC: 20 MG/DL (ref 7–30)
CALCIUM SERPL-MCNC: 8.4 MG/DL (ref 8.5–10.1)
CHLORIDE BLD-SCNC: 106 MMOL/L (ref 94–109)
CO2 SERPL-SCNC: 24 MMOL/L (ref 20–32)
CREAT SERPL-MCNC: 0.5 MG/DL (ref 0.52–1.04)
DIASTOLIC BLOOD PRESSURE - MUSE: NORMAL MMHG
EOSINOPHIL # BLD AUTO: 0 10E3/UL (ref 0–0.7)
EOSINOPHIL NFR BLD AUTO: 0 %
ERYTHROCYTE [DISTWIDTH] IN BLOOD BY AUTOMATED COUNT: 13.2 % (ref 10–15)
GFR SERPL CREATININE-BSD FRML MDRD: >90 ML/MIN/1.73M2
GLUCOSE BLD-MCNC: 136 MG/DL (ref 70–99)
HCT VFR BLD AUTO: 35.9 % (ref 35–47)
HGB BLD-MCNC: 11.7 G/DL (ref 11.7–15.7)
HOLD SPECIMEN: NORMAL
IMM GRANULOCYTES # BLD: 0 10E3/UL
IMM GRANULOCYTES NFR BLD: 0 %
INTERPRETATION ECG - MUSE: NORMAL
LYMPHOCYTES # BLD AUTO: 1.1 10E3/UL (ref 0.8–5.3)
LYMPHOCYTES NFR BLD AUTO: 15 %
MCH RBC QN AUTO: 30.5 PG (ref 26.5–33)
MCHC RBC AUTO-ENTMCNC: 32.6 G/DL (ref 31.5–36.5)
MCV RBC AUTO: 94 FL (ref 78–100)
MONOCYTES # BLD AUTO: 0.4 10E3/UL (ref 0–1.3)
MONOCYTES NFR BLD AUTO: 6 %
NEUTROPHILS # BLD AUTO: 5.7 10E3/UL (ref 1.6–8.3)
NEUTROPHILS NFR BLD AUTO: 79 %
NRBC # BLD AUTO: 0 10E3/UL
NRBC BLD AUTO-RTO: 0 /100
P AXIS - MUSE: 27 DEGREES
PLATELET # BLD AUTO: 198 10E3/UL (ref 150–450)
POTASSIUM BLD-SCNC: 4 MMOL/L (ref 3.4–5.3)
PR INTERVAL - MUSE: 140 MS
QRS DURATION - MUSE: 82 MS
QT - MUSE: 414 MS
QTC - MUSE: 449 MS
R AXIS - MUSE: -7 DEGREES
RBC # BLD AUTO: 3.84 10E6/UL (ref 3.8–5.2)
SARS-COV-2 RNA RESP QL NAA+PROBE: POSITIVE
SODIUM SERPL-SCNC: 137 MMOL/L (ref 133–144)
SYSTOLIC BLOOD PRESSURE - MUSE: NORMAL MMHG
T AXIS - MUSE: 14 DEGREES
TROPONIN I SERPL HS-MCNC: 4 NG/L
TROPONIN I SERPL HS-MCNC: 4 NG/L
VENTRICULAR RATE- MUSE: 71 BPM
WBC # BLD AUTO: 7.2 10E3/UL (ref 4–11)

## 2022-01-28 PROCEDURE — 258N000003 HC RX IP 258 OP 636: Performed by: EMERGENCY MEDICINE

## 2022-01-28 PROCEDURE — 93005 ELECTROCARDIOGRAM TRACING: CPT | Mod: 76

## 2022-01-28 PROCEDURE — 84484 ASSAY OF TROPONIN QUANT: CPT | Performed by: EMERGENCY MEDICINE

## 2022-01-28 PROCEDURE — 82374 ASSAY BLOOD CARBON DIOXIDE: CPT | Performed by: EMERGENCY MEDICINE

## 2022-01-28 PROCEDURE — 36415 COLL VENOUS BLD VENIPUNCTURE: CPT | Performed by: EMERGENCY MEDICINE

## 2022-01-28 PROCEDURE — 87635 SARS-COV-2 COVID-19 AMP PRB: CPT | Performed by: EMERGENCY MEDICINE

## 2022-01-28 PROCEDURE — 99285 EMERGENCY DEPT VISIT HI MDM: CPT | Mod: 25

## 2022-01-28 PROCEDURE — 85025 COMPLETE CBC W/AUTO DIFF WBC: CPT | Performed by: EMERGENCY MEDICINE

## 2022-01-28 PROCEDURE — 96360 HYDRATION IV INFUSION INIT: CPT

## 2022-01-28 PROCEDURE — 96361 HYDRATE IV INFUSION ADD-ON: CPT

## 2022-01-28 PROCEDURE — 93005 ELECTROCARDIOGRAM TRACING: CPT

## 2022-01-28 PROCEDURE — C9803 HOPD COVID-19 SPEC COLLECT: HCPCS

## 2022-01-28 PROCEDURE — 250N000013 HC RX MED GY IP 250 OP 250 PS 637: Performed by: EMERGENCY MEDICINE

## 2022-01-28 RX ORDER — ASPIRIN 81 MG/1
324 TABLET, CHEWABLE ORAL ONCE
Status: COMPLETED | OUTPATIENT
Start: 2022-01-28 | End: 2022-01-28

## 2022-01-28 RX ADMIN — SODIUM CHLORIDE 500 ML: 9 INJECTION, SOLUTION INTRAVENOUS at 14:09

## 2022-01-28 RX ADMIN — ASPIRIN 81 MG CHEWABLE TABLET 324 MG: 81 TABLET CHEWABLE at 11:21

## 2022-01-28 ASSESSMENT — ENCOUNTER SYMPTOMS
SHORTNESS OF BREATH: 0
FEVER: 0
LIGHT-HEADEDNESS: 0
NAUSEA: 1
COUGH: 0
ABDOMINAL PAIN: 0
VOMITING: 0

## 2022-01-28 ASSESSMENT — MIFFLIN-ST. JEOR: SCORE: 1114.36

## 2022-01-28 NOTE — ED TRIAGE NOTES
O2 sats 92-94% at home. Had a covid exposure. Possible new LBB with ischemia on EKG per clinic. Concerned for heart issue. No chest pain. No known cardiac history, does not go to the doctor very often. Feeling chills and sweating yesterday. Dry cough starting

## 2022-01-28 NOTE — ED PROVIDER NOTES
"  History     Chief Complaint:    Cardiac Concern      HPI   Ping Guerrero is a 66 year old female who presents from clinic due to an abnormal EKG. The patient herself states that she was in her normal state of health prior to waking up today. She notes that this morning she felt nauseated but did not vomit. She states that she was able to eat today and at present, she feels that her nausea is gone.    The triage note states that she came from [place] and the patient herself states that she came from a \"[facility] hospital\" so I wonder if this was [facility]. The note mentions that there was a pulse ox at home between 92 and 94% and there was a potential Covid exposure though when asked directly about this, the patient states I do not think so when asked about Covid exposure. The patient's EKG looked strange and the questions from the referring facility were a left bundle branch block and perhaps ischemia.    She denies chest pain, shortness of breath, lightheadedness.     Review of Systems   Constitutional: Negative for fever.   Respiratory: Negative for cough and shortness of breath.    Cardiovascular: Negative for chest pain.   Gastrointestinal: Positive for nausea. Negative for abdominal pain and vomiting.   Neurological: Negative for light-headedness.   All other systems reviewed and are negative.    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  Margarita (daughter): [phone] - Margarita needs Mandarin " " as well      Physical Exam     Patient Vitals for the past 24 hrs:   BP Temp Temp src Pulse Resp SpO2 Height Weight   01/28/22 1826 -- -- -- -- -- -- 1.53 m (5' 0.24\") 64.4 kg (142 lb)   01/28/22 1703 124/79 -- -- 59 10 -- -- --   01/28/22 1530 105/77 -- -- 61 -- -- -- --   01/28/22 1500 99/52 -- -- 54 -- -- -- --   01/28/22 1445 98/71 -- -- 58 16 98 % -- --   01/28/22 1415 93/42 -- -- 53 16 -- -- --   01/28/22 1400 90/58 -- -- 60 22 -- -- --   01/28/22 1345 92/69 -- -- 52 22 -- -- --   01/28/22 1300 91/71 -- -- 58 16 -- -- --   01/28/22 1245 (!) 88/71 -- -- 60 20 -- -- --   01/28/22 1230 94/65 -- -- 62 21 -- -- --   01/28/22 1215 92/62 -- -- 65 18 -- -- --   01/28/22 1200 98/68 -- -- 70 15 -- -- --   01/28/22 1145 99/67 -- -- 68 15 -- -- --   01/28/22 1100 -- -- -- 77 17 96 % -- --   01/28/22 1045 119/80 -- -- -- -- -- -- --   01/28/22 1027 119/79 98.2  F (36.8  C) Temporal 77 20 100 % -- --       Physical Exam  Constitutional: Vital signs reviewed as above.   Head: No external signs of trauma noted.  Eyes: Pupils are equal, round, and reactive to light.   Neck: No JVD noted  Cardiovascular: Normal rate, regular rhythm and normal heart sounds.  No murmur heard. Equal B/L peripheral pulses.  Pulmonary/Chest: Effort normal and breath sounds normal. No respiratory distress. Patient has no wheezes. Patient has no rales.   Gastrointestinal: Soft. There is no tenderness.   Musculoskeletal/Extremities: No edema noted. Normal tone.  Neurological: Patient is alert and oriented to person, place, and time.   Skin: Skin is warm and dry. There is no diaphoresis noted.   Psychiatric: The patient appears calm.      Emergency Department Course   ECG:  ECG taken at 1036, ECG read at 1057 (initial STEMI review was @ 1040 AM)  Sinus rhythm with premature atrial complexes  ST and T wave abnormality, consider anterolateral ischemia  Abnormal ECG    No old ECG for comparison.  Rate 71 bpm. HI interval 140 ms. QRS duration 82 " ms. QT/QTc 414/449 ms. P-R-T axes 27 -7 14.     ECG  ECG taken at 1509, ECG read at 1512  Sinus rhythm  Inferior infarct, age undetermined  ST and T wave abnormality, consider anterior ischemia  Abnormal ECG    When compared with ECG dated 1/28/2022 at 10:36 AM, there is no significant change.  Rate 60 bpm. UT interval 164 ms. QRS duration 76 ms. QT/QTc 468/468 ms. P-R-T axes 36 -5 -1.     Imaging:  Exercise Stress Echocardiogram    (Results Pending)       Laboratory:  Labs Ordered and Resulted from Time of ED Arrival to Time of ED Departure   BASIC METABOLIC PANEL - Abnormal       Result Value    Sodium 137      Potassium 4.0      Chloride 106      Carbon Dioxide (CO2) 24      Anion Gap 7      Urea Nitrogen 20      Creatinine 0.50 (*)     Calcium 8.4 (*)     Glucose 136 (*)     GFR Estimate >90     TROPONIN I - Normal    Troponin I High Sensitivity 4     TROPONIN I - Normal    Troponin I High Sensitivity 4     CBC WITH PLATELETS AND DIFFERENTIAL    WBC Count 7.2      RBC Count 3.84      Hemoglobin 11.7      Hematocrit 35.9      MCV 94      MCH 30.5      MCHC 32.6      RDW 13.2      Platelet Count 198      % Neutrophils 79      % Lymphocytes 15      % Monocytes 6      % Eosinophils 0      % Basophils 0      % Immature Granulocytes 0      NRBCs per 100 WBC 0      Absolute Neutrophils 5.7      Absolute Lymphocytes 1.1      Absolute Monocytes 0.4      Absolute Eosinophils 0.0      Absolute Basophils 0.0      Absolute Immature Granulocytes 0.0      Absolute NRBCs 0.0         Procedures:      Emergency Department Course:           Reviewed:    I reviewed nursing notes, vitals, past history and care everywhere    Assessments/Consults:       ED Course as of 01/28/22 1840   Fri Jan 28, 2022   1057 Dr. Fay' evaluation   1452 Updated daughter.   1519 D/W Margaret   1636 Rechecked.   1650 Updated daughter.  The patient follows at [facility] in [place].  I will plan on ordering an exercise stress echocardiogram as an  outpatient test and CCing the results to that clinic.  The patient's daughter is comfortable with this plan.   1834 I contacted the patient's daughter and got an updated height and weight.  Her COVID medication will be E prescribed to the [facility].  I gave the daughter the number as well as their hours for this weekend.       Interventions:    Medications   aspirin (ASA) chewable tablet 324 mg (324 mg Oral Given 1/28/22 1121)   0.9% sodium chloride BOLUS (0 mLs Intravenous Stopped 1/28/22 1544)       Disposition:  The patient was discharged to home.    Impression & Plan      CMS Diagnoses:         Medical Decision Making:  Ping Guerrero is a 66 year old female presented to the Emergency Department with a complaint of nausea. Fortunately the workup in the ED has been unremarkable and at this time I am not concerned for ACS.  2 EKGs ordered in the emergency department do not show any dynamic changes, though there are T wave inversions noted.  No old EKGs were available for comparison. The troponin is negative x2 (and per the [facility] high-sensitivity troponin pathway, this should rule the patient out for myocardial injury).  The patient denied chest pain, shortness of breath.    The physical exam, laboratory, and radiological findings listed above make life threatening conditions less likely. At this time I believe the patient is stable for discharge. I have encouraged close Primary Care Physician follow up.  Anticipatory guidance given prior to discharge.    Addendum: The patient's Covid test flagged as positive after discharge.  See below for the patient's MASSBP score. She would qualify for Molnupriavir. This will be e-prescribed to the [facility].    Critical Care time:  none    Covid-19  Ping Guerrero was evaluated during a global COVID-19 pandemic, which necessitated consideration that the patient might be at risk for infection with the SARS-CoV-2 virus that causes  "COVID-19.   Applicable protocols for evaluation were followed during the patient's care.   COVID-19 was considered as part of the patient's evaluation. The plan for testing is:  a test was obtained during this visit.    Diagnosis:    ICD-10-CM    1. Abnormal electrocardiogram  R94.31 Exercise Stress Echocardiogram   2. Infection due to 2019 novel coronavirus  U07.1 COVID-19 [facility] Referral     Care Coordination Referral       Discharge Medications:  Discharge Medication List as of 1/28/2022  5:03 PM                     MASSBP Score 1/28/2022   Age Greater than or equal to 65 years 2   BMI greater than or equal to 35 kg/m2 0   Has Diabetes Mellitus 0   Has Chronic Kidney Disease 0   Has Cardiovascular Disease and 55 years or older 0   Has Chronic Respiratory Disease and 55 years or older 0   Has Hypertension and 55 years or older 0   Is Immunocompromised 0   Is Pregnant 0   Member of BIPOC community (Black/, /, ,  or , or  or [place] Native)  0   MASSBP Score 2   Has the patient had a positive COVID test outside our system?  No   What day did symptoms start?  1/28/2022       Estimated body mass index is 27.52 kg/m  as calculated from the following:    Height as of this encounter: 1.53 m (5' 0.24\").    Weight as of this encounter: 64.4 kg (142 lb).     GFR Estimate   Date Value Ref Range Status   01/28/2022 >90 >60 mL/min/1.73m2 Final     Comment:     Effective December 21, 2021 eGFRcr in adults is calculated using the 2021 CKD-EPI creatinine equation which includes age and gender (Nilson cerda al., NEJ, DOI: 10.1056/HYTLuo8180205)   09/24/2020 >90 >60 mL/min/[1.73_m2] Final     Comment:     Non  GFR Calc  Starting 12/18/2018, serum creatinine based estimated GFR (eGFR) will be   calculated using the Chronic Kidney Disease Epidemiology Collaboration   (CKD-EPI) equation.          FDA Facts Sheet  Lexicomp Drug " Interaction review    Medications were reviewed with the patient. She is not on any prescription medications.    No current facility-administered medications for this encounter.     Current Outpatient Medications   Medication     molnupiravir 200 MG capsule     ketotifen (ALAWAY) 0.025 % ophthalmic solution     Multiple Vitamin (MULTIVITAMIN PO)     sulfamethoxazole-trimethoprim (BACTRIM DS) 800-160 MG tablet                                          Mark Fay,   01/28/22 4464       Mark Fay,   01/28/22 3508

## 2022-01-28 NOTE — ED NOTES
Pt's daughter Margarita requested updates and noted needing a Mandarin . Margarita's number is [phone].

## 2022-01-29 ENCOUNTER — PATIENT OUTREACH (OUTPATIENT)
Dept: CARE COORDINATION | Facility: CLINIC | Age: 67
End: 2022-01-29
Payer: COMMERCIAL

## 2022-01-29 NOTE — PROGRESS NOTES
Clinic Care Coordination Contact    Referral Type: Virtual Home Monitoring - [facility] Program    Patient referred for Virtual Home Monitoring Program for COVID-19 following recent [facility] ED visit.  [facility] referral is in place.    Criteria for Virtual Home Monitoring telephone outreach is not met after review of ED encounter/ED provider note because:    1) ED provider note indicates assessment was negative for respiratory distress. O2 sats were stable throughout course of ED visit per chart review.      2) Patient was not discharged with new supplemental oxygen.    Per notes, ED provider and/or ED care team discussed appropriate follow up guidelines with patient prior to discharge or reflected these instructions on AVS.       [facility] team remains available to support patient via [email or web address] account once activated by patient.     Arely Santamaria, MELANIE  [facility]  - RN Care Coordinator

## 2022-01-29 NOTE — TELEPHONE ENCOUNTER
"Coronavirus (COVID-19) Notification    Pt declined treatment opts \"I think i'm not that sick.\" Will reach out to pcp if has any concerns. Knows notice in mail to addressed verified will arrive in 7-10d. No further questions.    Caller Name (Patient, parent, daughter/son, grandparent, etc)  Pt    Reason for call  Notify of Positive Coronavirus (COVID-19) lab results, assess symptoms,  review  [employer] recommendations    Lab Result    Lab test:  2019-nCoV rRt-PCR or SARS-CoV-2 PCR    Oropharyngeal AND/OR nasopharyngeal swabs is POSITIVE for 2019-nCoV RNA/SARS-COV-2 PCR (COVID-19 virus)    RN Recommendations/Instructions per  [facility] Coronavirus COVID-19 recommendations    Brief introduction script  Introduce self then review script:  \"I am calling on behalf of [facility].  We were notified that your Coronavirus test (COVID-19) for was POSITIVE for the virus.  I have some information to relay to you but first I wanted to mention that the [place] Dept of Health will be contacting you shortly [it's possible MD already called Patient] to talk to you more about how you are feeling and other people you have had contact with who might now also have the virus.  Also,  [facility] is Partnering with the [facility] for Covid-19 research, you may be contacted directly by research staff.\"      Assessment (Inquire about Patient's current symptoms)   Assessment   Current Symptoms at time of phone call: (if no symptoms, document No symptoms] Feeling cold, dizzy   Symptoms onset (if applicable) 01/27/22     If at time of call, Patients symptoms hare worsened, the Patient should contact 911 or have someone drive them to Emergency Dept promptly:      If Patient calling 911, inform 911 personal that you have tested positive for the Coronavirus (COVID-19).  Place mask on and await 911 to arrive.    If Emergency Dept, If possible, please have another adult drive you to the Emergency Dept but you need to wear " mask when in contact with other people.          Treatment Options:   Patient classified as COVID treatment eligible by Epic high risk algorithm: Yes  Is the patient symptomatic at the time of result notification? Yes. Was the onset of symptoms within the last 5 days? No. You may be eligible to receive a new treatment with a monoclonal antibody for preventing hospitalization in patients at high risk for complications from COVID-19.   This medication is still experimental and available on a limited basis; it is given through an IV and must be given at an infusion center. Please note that not all people who are eligible will receive the medication since it is in limited supply.  Are you interested in being considered for this medication?  No.     Review information with Patient    Your result was positive. This means you have COVID-19 (coronavirus).  We have sent you a letter that reviews the information that I'll be reviewing with you now.    How can I protect others?    If you have symptoms: stay home and away from others (self-isolate) until:    You've had no fever--and no medicine that reduces fever--for 1 full day (24 hours). And       Your other symptoms have gotten better. For example, your cough or breathing has improved. And     At least 10 days have passed since your symptoms started. (If you've been told by a doctor that you have a weak immune system, wait 20 days.)     If you don't have symptoms: Stay home and away from others (self-isolate) until at least 10 days have passed since your first positive COVID-19 test. (Date test collected)    During this time:    Stay in your own room, including for meals. Use your own bathroom if you can.    Stay away from others in your home. No hugging, kissing or shaking hands. No visitors.     Don't go to work, school or anywhere else.     Clean  high touch  surfaces often (doorknobs, counters, handles, etc.). Use a household cleaning spray or wipes. You'll find a full  list on the EPA website at www.epa.gov/pesticide-registration/list-n-disinfectants-use-against-sars-cov-2.     Cover your mouth and nose with a mask, tissue or other face covering to avoid spreading germs.    Wash your hands and face often with soap and water.    Make a list of people you have been in close contact with recently, even if either of you wore a face covering.   - Start your list from 2 days before you became ill or had a positive test.  - Include anyone that was within 6 feet of you for a cumulative total of 15 minutes or more in 24 hours. (Example: if you sat next to Todd for 5 minutes in the morning and 10 minutes in the afternoon, then you were in close contact for 15 minutes total that day. Todd would be added to your list.)    A public health worker will call or text you. It is important that you answer. They will ask you questions about possible exposures to COVID-19, such as people you have been in direct contact with and places you have visited.    Tell the people on your list that you have COVID-19; they should stay away from others for 14 days starting from the last time they were in contact with you (unless you are told something different from a public health worker).     Caregivers in these groups are at risk for severe illness due to COVID-19:  o People 65 years and older  o People who live in a nursing home or long-term care facility  o People with chronic disease (lung, heart, cancer, diabetes, kidney, liver, immunologic)  o People who have a weakened immune system, including those who:  - Are in cancer treatment  - Take medicine that weakens the immune system, such as corticosteroids  - Had a bone marrow or organ transplant  - Have an immune deficiency  - Have poorly controlled HIV or AIDS  - Are obese (body mass index of 40 or higher)  - Smoke regularly    Caregivers should wear gloves while washing dishes, handling laundry and cleaning bedrooms and bathrooms.    Wash and dry laundry  with special caution. Don't shake dirty laundry, and use the warmest water setting you can.    If you have a weakened immune system, ask your doctor about other actions you should take.    For more tips, go to www.cdc.gov/coronavirus/2019-ncov/downloads/10Things.pdf.    You should not go back to work until you meet the guidelines above for ending your home isolation. You don't need to be retested for COVID-19 before going back to work--studies show that you won't spread the virus if it's been at least 10 days since your symptoms started (or 20 days, if you have a weak immune system).    Employers: This document serves as formal notice of your employee's medical guidelines for going back to work. They must meet the above guidelines before going back to work in person.    How can I take care of myself?    1. Get lots of rest. Drink extra fluids (unless a doctor has told you not to).    2. Take Tylenol (acetaminophen) for fever or pain. If you have liver or kidney problems, ask your family doctor if it's okay to take Tylenol.     Take either:     650 mg (two 325 mg pills) every 4 to 6 hours, or     1,000 mg (two 500 mg pills) every 8 hours as needed.     Note: Don't take more than 3,000 mg in one day. Acetaminophen is found in many medicines (both prescribed and over-the-counter medicines). Read all labels to be sure you don't take too much.    For children, check the Tylenol bottle for the right dose (based on their age or weight).    3. If you have other health problems (like cancer, heart failure, an organ transplant or severe kidney disease): Call your specialty clinic if you don't feel better in the next 2 days.    4. Know when to call 911: Emergency warning signs include:    Trouble breathing or shortness of breath    Pain or pressure in the chest that doesn't go away    Feeling confused like you haven't felt before, or not being able to wake up    Bluish-colored lips or face    5. Sign up for [facility]. We  know it's scary to hear that you have COVID-19. We want to track your symptoms to make sure you're okay over the next 2 weeks. Please look for an email from [employer]--this is a free, online program that we'll use to keep in touch. To sign up, follow the link in the email. Learn more at [email or web address].    Where can I get more information?    [facility]: [email or web address]    Coronavirus Basics: www.health[email or web address]./diseases/coronavirus/basics.html    What to Do If You're Sick: www.cdc.gov/coronavirus/2019-ncov/about/steps-when-sick.html    Ending Home Isolation: www.cdc.gov/coronavirus/2019-ncov/hcp/disposition-in-home-patients.html     Caring for Someone with COVID-19: www.cdc.gov/coronavirus/2019-ncov/if-you-are-sick/care-for-someone.html     [facility] clinical trials (COVID-19 research studies): [email or web address]     A Positive COVID-19 letter will be sent via [employer] or the mail. (Exception, no letters sent to Presurgerical/Preprocedure Patients)    Roxane Gomez

## 2022-01-31 LAB
ATRIAL RATE - MUSE: 60 BPM
DIASTOLIC BLOOD PRESSURE - MUSE: NORMAL MMHG
INTERPRETATION ECG - MUSE: NORMAL
P AXIS - MUSE: 36 DEGREES
PR INTERVAL - MUSE: 164 MS
QRS DURATION - MUSE: 76 MS
QT - MUSE: 468 MS
QTC - MUSE: 468 MS
R AXIS - MUSE: -5 DEGREES
SYSTOLIC BLOOD PRESSURE - MUSE: NORMAL MMHG
T AXIS - MUSE: -1 DEGREES
VENTRICULAR RATE- MUSE: 60 BPM

## 2022-02-15 ENCOUNTER — HOSPITAL ENCOUNTER (OUTPATIENT)
Dept: CARDIOLOGY | Facility: CLINIC | Age: 67
Discharge: HOME | End: 2022-02-15
Attending: EMERGENCY MEDICINE | Admitting: EMERGENCY MEDICINE
Payer: COMMERCIAL

## 2022-02-15 DIAGNOSIS — R94.31 ABNORMAL ELECTROCARDIOGRAM: ICD-10-CM

## 2022-02-15 PROCEDURE — 93325 DOPPLER ECHO COLOR FLOW MAPG: CPT | Mod: 26 | Performed by: INTERNAL MEDICINE

## 2022-02-15 PROCEDURE — 255N000002 HC RX 255 OP 636: Performed by: EMERGENCY MEDICINE

## 2022-02-15 PROCEDURE — 93350 STRESS TTE ONLY: CPT | Mod: 26 | Performed by: INTERNAL MEDICINE

## 2022-02-15 PROCEDURE — 93018 CV STRESS TEST I&R ONLY: CPT | Performed by: INTERNAL MEDICINE

## 2022-02-15 PROCEDURE — 93016 CV STRESS TEST SUPVJ ONLY: CPT | Performed by: INTERNAL MEDICINE

## 2022-02-15 PROCEDURE — 999N000208 ECHO STRESS ECHOCARDIOGRAM

## 2022-02-15 PROCEDURE — 93321 DOPPLER ECHO F-UP/LMTD STD: CPT | Mod: 26 | Performed by: INTERNAL MEDICINE

## 2022-02-15 RX ADMIN — HUMAN ALBUMIN MICROSPHERES AND PERFLUTREN 3 ML: 10; .22 INJECTION, SOLUTION INTRAVENOUS at 10:51

## 2022-02-28 ENCOUNTER — OFFICE VISIT (OUTPATIENT)
Dept: FAMILY MEDICINE | Facility: CLINIC | Age: 67
End: 2022-02-28
Payer: COMMERCIAL

## 2022-02-28 VITALS
HEART RATE: 65 BPM | WEIGHT: 141 LBS | DIASTOLIC BLOOD PRESSURE: 76 MMHG | TEMPERATURE: 97.8 F | BODY MASS INDEX: 27.32 KG/M2 | OXYGEN SATURATION: 97 % | SYSTOLIC BLOOD PRESSURE: 122 MMHG

## 2022-02-28 DIAGNOSIS — Z12.11 COLON CANCER SCREENING: ICD-10-CM

## 2022-02-28 DIAGNOSIS — Z78.0 ASYMPTOMATIC POSTMENOPAUSAL STATUS: ICD-10-CM

## 2022-02-28 DIAGNOSIS — I27.20 PULMONARY HYPERTENSION (H): ICD-10-CM

## 2022-02-28 DIAGNOSIS — Z00.00 ENCOUNTER FOR MEDICARE ANNUAL WELLNESS EXAM: Primary | ICD-10-CM

## 2022-02-28 DIAGNOSIS — I77.810 ASCENDING AORTA DILATION (H): ICD-10-CM

## 2022-02-28 DIAGNOSIS — Z23 NEED FOR PNEUMOCOCCAL VACCINATION: ICD-10-CM

## 2022-02-28 LAB
ALBUMIN SERPL-MCNC: 3.5 G/DL (ref 3.4–5)
ALP SERPL-CCNC: 73 U/L (ref 40–150)
ALT SERPL W P-5'-P-CCNC: 12 U/L (ref 0–50)
ANION GAP SERPL CALCULATED.3IONS-SCNC: 4 MMOL/L (ref 3–14)
AST SERPL W P-5'-P-CCNC: 15 U/L (ref 0–45)
BILIRUB SERPL-MCNC: 1.2 MG/DL (ref 0.2–1.3)
BUN SERPL-MCNC: 15 MG/DL (ref 7–30)
CALCIUM SERPL-MCNC: 8.9 MG/DL (ref 8.5–10.1)
CHLORIDE BLD-SCNC: 109 MMOL/L (ref 94–109)
CHOLEST SERPL-MCNC: 156 MG/DL
CO2 SERPL-SCNC: 28 MMOL/L (ref 20–32)
CREAT SERPL-MCNC: 0.58 MG/DL (ref 0.52–1.04)
FASTING STATUS PATIENT QL REPORTED: ABNORMAL
GFR SERPL CREATININE-BSD FRML MDRD: >90 ML/MIN/1.73M2
GLUCOSE BLD-MCNC: 100 MG/DL (ref 70–99)
HDLC SERPL-MCNC: 37 MG/DL
LDLC SERPL CALC-MCNC: 88 MG/DL
NONHDLC SERPL-MCNC: 119 MG/DL
POTASSIUM BLD-SCNC: 3.9 MMOL/L (ref 3.4–5.3)
PROT SERPL-MCNC: 7.7 G/DL (ref 6.8–8.8)
SODIUM SERPL-SCNC: 141 MMOL/L (ref 133–144)
TRIGL SERPL-MCNC: 156 MG/DL
TSH SERPL DL<=0.005 MIU/L-ACNC: 1.71 MU/L (ref 0.4–4)

## 2022-02-28 PROCEDURE — 90732 PPSV23 VACC 2 YRS+ SUBQ/IM: CPT | Performed by: FAMILY MEDICINE

## 2022-02-28 PROCEDURE — 99397 PER PM REEVAL EST PAT 65+ YR: CPT | Mod: 25 | Performed by: FAMILY MEDICINE

## 2022-02-28 PROCEDURE — 36415 COLL VENOUS BLD VENIPUNCTURE: CPT | Performed by: FAMILY MEDICINE

## 2022-02-28 PROCEDURE — 80053 COMPREHEN METABOLIC PANEL: CPT | Performed by: FAMILY MEDICINE

## 2022-02-28 PROCEDURE — 90471 IMMUNIZATION ADMIN: CPT | Performed by: FAMILY MEDICINE

## 2022-02-28 PROCEDURE — 80061 LIPID PANEL: CPT | Performed by: FAMILY MEDICINE

## 2022-02-28 PROCEDURE — 99214 OFFICE O/P EST MOD 30 MIN: CPT | Mod: 25 | Performed by: FAMILY MEDICINE

## 2022-02-28 PROCEDURE — 84443 ASSAY THYROID STIM HORMONE: CPT | Performed by: FAMILY MEDICINE

## 2022-02-28 RX ORDER — MULTIVIT-MIN/FOLIC ACID/BIOTIN 400-400MCG
1 CAPSULE ORAL DAILY
COMMUNITY
End: 2022-03-24

## 2022-02-28 NOTE — PATIENT INSTRUCTIONS
Patient Education   Personalized Prevention Plan  You are due for the preventive services outlined below.  Your care team is available to assist you in scheduling these services.  If you have already completed any of these items, please share that information with your care team to update in your medical record.  Health Maintenance Due   Topic Date Due     Osteoporosis Screening  Never done     ANNUAL REVIEW OF HM ORDERS  Never done     Colorectal Cancer Screening  Never done     HIV Screening  Never done     Annual Wellness Visit  11/09/2021     FALL RISK ASSESSMENT  Never done     PHQ-2  01/01/2022     Pneumococcal Vaccine (1 of 1 - PPSV23) 11/09/2021

## 2022-02-28 NOTE — LETTER
March 8, 2022      Ping Cesar  [id number] [facility] [address] [id number]        Dear ,    We are writing to inform you of your test results.    Please find attached the lab results from your recent physical.   All labs are within normal limits and do not require further investigation.   For further questions or concerns please let us know.       Resulted Orders   Lipid panel reflex to direct LDL Fasting   Result Value Ref Range    Cholesterol 156 <200 mg/dL    Triglycerides 156 (H) <150 mg/dL    Direct Measure HDL 37 (L) >=50 mg/dL    LDL Cholesterol Calculated 88 <=100 mg/dL    Non HDL Cholesterol 119 <130 mg/dL    Patient Fasting > 8hrs? Unknown     Narrative    Cholesterol  Desirable:  <200 mg/dL    Triglycerides  Normal:  Less than 150 mg/dL  Borderline High:  150-199 mg/dL  High:  200-499 mg/dL  Very High:  Greater than or equal to 500 mg/dL    Direct Measure HDL  Female:  Greater than or equal to 50 mg/dL   Male:  Greater than or equal to 40 mg/dL    LDL Cholesterol  Desirable:  <100mg/dL  Above Desirable:  100-129 mg/dL   Borderline High:  130-159 mg/dL   High:  160-189 mg/dL   Very High:  >= 190 mg/dL    Non HDL Cholesterol  Desirable:  130 mg/dL  Above Desirable:  130-159 mg/dL  Borderline High:  160-189 mg/dL  High:  190-219 mg/dL  Very High:  Greater than or equal to 220 mg/dL   Comprehensive metabolic panel (BMP + Alb, Alk Phos, ALT, AST, Total. Bili, TP)   Result Value Ref Range    Sodium 141 133 - 144 mmol/L    Potassium 3.9 3.4 - 5.3 mmol/L    Chloride 109 94 - 109 mmol/L    Carbon Dioxide (CO2) 28 20 - 32 mmol/L    Anion Gap 4 3 - 14 mmol/L    Urea Nitrogen 15 7 - 30 mg/dL    Creatinine 0.58 0.52 - 1.04 mg/dL    Calcium 8.9 8.5 - 10.1 mg/dL    Glucose 100 (H) 70 - 99 mg/dL    Alkaline Phosphatase 73 40 - 150 U/L    AST 15 0 - 45 U/L    ALT 12 0 - 50 U/L    Protein Total 7.7 6.8 - 8.8 g/dL    Albumin 3.5 3.4 - 5.0 g/dL    Bilirubin Total 1.2 0.2 - 1.3 mg/dL    GFR Estimate >90 >60 mL/min/1.73m2       Comment:      Effective December 21, 2021 eGFRcr in adults is calculated using the 2021 CKD-EPI creatinine equation which includes age and gender (Nilson et al., NEJM, DOI: 10.1056/WDCMyg0168136)   TSH with free T4 reflex   Result Value Ref Range    TSH 1.71 0.40 - 4.00 mU/L       If you have any questions or concerns, please call the clinic at the number listed above.       Sincerely,      Ashely Fregoso MD

## 2022-02-28 NOTE — PROGRESS NOTES
"  Assessment & Plan     Encounter for Medicare annual wellness exam  - Lipid panel reflex to direct LDL Fasting; Future  - Comprehensive metabolic panel (BMP + Alb, Alk Phos, ALT, AST, Total. Bili, TP); Future  - TSH with free T4 reflex; Future  - Lipid panel reflex to direct LDL Fasting  - Comprehensive metabolic panel (BMP + Alb, Alk Phos, ALT, AST, Total. Bili, TP)  - TSH with free T4 reflex    Ascending aorta dilation (H)  - as per recent echo. will refer to cardiology for further evaluation   - Adult Cardiology Eval  Referral; Future    Pulmonary hypertension - mild (H)  - as per recent echo   - Adult Cardiology Eval  Referral; Future    Colon cancer screening  - Adult Gastro Ref - Procedure Only; Future    Asymptomatic postmenopausal status  - DX Hip/Pelvis/Spine; Future  - calcium carbonate (OS-JUDY) 1500 (600 Ca) MG tablet; Take 1 tablet (600 mg) by mouth 2 times daily (with meals)    Need for pneumococcal vaccination  - PPSV23, IM/SUBQ (2+ YRS) - [id number]             BMI:   Estimated body mass index is 27.32 kg/m  as calculated from the following:    Height as of 1/28/22: 1.53 m (5' 0.24\").    Weight as of this encounter: 64 kg (141 lb).       See Patient Instructions    Return in about 53 weeks (around 3/6/2023) for Annual Wellness Visit.    Ashely Fregoso MD  [facility] APRIL Feng is a 66 year old who presents for the following health issues  accompanied by her daughter.    HPI     ED/UC Followup:    Facility:  [facility] Emergency Dept  Date of visit:1/28/2022    Reason for visit: Abnormal electrocardiogram   Current Status: patient's daughter said that the patient feels normal right now       Annual Wellness Visit  {  Patient has been advised of split billing requirements and indicates understanding: Yes     Are you in the first 12 months of your Medicare Part B coverage?  No    Physical Health:    In general, how " "would you rate your overall physical health? good    Outside of work, how many days during the week do you exercise?none    Outside of work, approximately how many minutes a day do you exercise?less than 15 minutes    If you drink alcohol do you typically have >3 drinks per day or >7 drinks per week? No    Do you usually eat at least 4 servings of fruit and vegetables a day, include whole grains & fiber and avoid regularly eating high fat or \"junk\" foods? Yes    Do you have any problems taking medications regularly? No    Do you have any side effects from medications? none    Needs assistance for the following daily activities: no assistance needed    Which of the following safety concerns are present in your home?  none identified     Hearing impairment: No    In the past 6 months, have you been bothered by leaking of urine? no    Mental Health:    In general, how would you rate your overall mental or emotional health? good  PHQ-2 Score:      Do you feel safe in your environment? Yes    Have you ever done Advance Care Planning? (For example, a Health Directive, POLST, or a discussion with a medical provider or your loved ones about your wishes)? No, advance care planning information given to patient to review.  Patient plans to discuss their wishes with loved ones or provider.      Fall risk:     click delete button to remove this line now  Cognitive Screening: not completed due to language and learning barrier         Current providers sharing in care for this patient include:   Patient Care Team:  Ashely Fregoso MD as PCP - General (Family Practice)  Cristy Mills OD as Assigned Surgical Provider  Latricia Luther Ra, APRN CNP as Assigned PCP    Patient has been advised of split billing requirements and indicates understanding: Yes    Review of Systems   Constitutional, HEENT, cardiovascular, pulmonary, GI, , musculoskeletal, neuro, skin, endocrine and psych systems are negative, " except as otherwise noted.      Objective    BP (!) 148/80 (BP Location: Right arm, Patient Position: Chair, Cuff Size: Adult Regular)   Pulse 65   Temp 97.8  F (36.6  C) (Oral)   Wt 64 kg (141 lb)   SpO2 97%   BMI 27.32 kg/m    Body mass index is 27.32 kg/m .  Physical Exam   GENERAL: healthy, alert and no distress  EYES: Eyes grossly normal to inspection, PERRL and conjunctivae and sclerae normal  HENT: ear canals and TM's normal, nose and mouth without ulcers or lesions  NECK: no adenopathy, no asymmetry, masses, or scars and thyroid normal to palpation  RESP: lungs clear to auscultation - no rales, rhonchi or wheezes  CV: regular rate and rhythm, normal S1 S2, no S3 or S4, no murmur, click or rub, no peripheral edema and peripheral pulses strong  ABDOMEN: soft, nontender, no hepatosplenomegaly, no masses and bowel sounds normal  MS: no gross musculoskeletal defects noted, no edema  SKIN: no suspicious lesions or rashes  NEURO: Normal strength and tone, mentation intact and speech normal  PSYCH: mentation appears normal, affect normal/bright      [facility]  Echocardiography Laboratory  [address] [place] [address]     Name: HATTIE BURT  MRN: [id number]  : 1955  Study Date: 02/15/2022 10:32 AM  Age: 66 yrs  Gender: Female  Patient Location: [facility]  Reason For Study: Abnormal electrocardiogram  History: Abn. EKG  Ordering Physician: JANE NOLEN  Referring Physician: None  Performed By: Jez Grey RDCS     BSA: 1.6 m2  Height: 60 in  Weight: 142 lb  HR: 71  BP: 102/64 mmHg  ______________________________________________________________________________  Procedure  Stress Echo Complete. Contrast Optison.  ______________________________________________________________________________  Interpretation Summary  This was a normal stress echocardiogram with no evidence of stress-induced  ischemia.     The ascending aorta is Moderately dilated 45mm 3 cm above  sinotubluar ridge)  Fair exercise tolerance ( 06:39 Rafa protocol / 8.0 Mets)  ______________________________________________________________________________  Stress  The patient exercised 6:39.  RPP [id number].  This study was stopped as the patient achieved an adequate exercise effort for  a diagnostic study.  There was a normal BP response to exercise.  The patient exhibited no chest pain during exercise.  Target Heart Rate was achieved.  The Duke treadmill score was low risk ( >5 Duke score).  There was a maximum 0.5mm ST segment depression in the lateral lead(s).  There was a maximum 0.5mm ST segment depression in the inferior lead(s).  Frequent premature atrial contractions.  This was a normal stress echocardiogram with no evidence of stress-induced  ischemia.  The visual ejection fraction is >70%.  Left ventricular cavity size decreases with exercise.  Global LV systolic function augments with exercise.  Normal resting wall motion and no stress-induced wall motion abnormality.     Baseline  The patient is in normal sinus rhythm.  Occasional premature atrial contractions.  The baseline ECG displays diffuse abnormal ST segments.  The visual ejection fraction is estimated at 60-65%.  No regional wall motion abnormalities noted.     Stress Results             Protocol:  Rafa          Maximum Predicted HR:   155 bpm             Target HR: 132 bpm        % Maximum Predicted HR: 85 %           Stage  DurationHeart Rate  BP                 Comment                (mm:ss)   (bpm)       Stage 1   3:00      125   118/60       Stage 2   3:00      125   118/60       Stage 3   0:39      131      /  Beyer Treadmill Score: 6.5 (Low Risk)      RecoveryR  6:00      75    110/60FAC: Average            Stress Duration:   6:39 mm:ss *        Recovery Time: 6:00 mm:ss         Maximum Stress HR: 131 bpm *           METS:          8     Left Ventricle  The left ventricle is normal in size.     Mitral Valve  The mitral valve leaflets  appear normal. There is no evidence of stenosis,  fluttering, or prolapse. There is mild (1+) mitral regurgitation. There is no  mitral valve stenosis.     Tricuspid Valve  Normal tricuspid valve. The right ventricular systolic pressure is  approximated at 32.5 mmHg plus the right atrial pressure. Right ventricular  systolic pressure is elevated, consistent with mild pulmonary hypertension.  There is no tricuspid stenosis.     Aortic Valve  The aortic valve is trileaflet. There is trace to mild aortic regurgitation.  No aortic stenosis is present.     Vessels  The ascending aorta is Moderately dilated.     ______________________________________________________________________________  MMode/2D Measurements & Calculations  asc Aorta Diam: 4.5 cm     Doppler Measurements & Calculations  TR max jen: 285.0 cm/sec  TR max P.5 mmHg     ______________________________________________________________________________  Report approved by: Dr. David Suarez 02/15/2022 01:30 PM

## 2022-03-01 ENCOUNTER — TELEPHONE (OUTPATIENT)
Dept: FAMILY MEDICINE | Facility: CLINIC | Age: 67
End: 2022-03-01
Payer: COMMERCIAL

## 2022-03-01 NOTE — TELEPHONE ENCOUNTER
Routing to Dr. Fregoso to review.     Pharmacy called, they would like clarification on which calcium you sent in is correct. They did receive the pharmacy note but they would like clarification and a confirmation on what is correct.     Both meds are still active on list. Pharmacy states ok to teams message them or call.     Stefani Randall, RN   [facility]  -- Triage Nurse

## 2022-03-08 NOTE — RESULT ENCOUNTER NOTE
Please send patient a letter to let them know results are normal.     Please find attached the lab results from your recent physical.   All labs are within normal limits and do not require further investigation.   For further questions or concerns please let us know.     Best Wishes,    Dr. Heredia

## 2022-03-15 ENCOUNTER — ANCILLARY PROCEDURE (OUTPATIENT)
Dept: BONE DENSITY | Facility: CLINIC | Age: 67
End: 2022-03-15
Attending: FAMILY MEDICINE
Payer: COMMERCIAL

## 2022-03-15 PROCEDURE — 77080 DXA BONE DENSITY AXIAL: CPT | Performed by: INTERNAL MEDICINE

## 2022-03-24 ENCOUNTER — CARE COORDINATION (OUTPATIENT)
Dept: CARDIOLOGY | Facility: CLINIC | Age: 67
End: 2022-03-24

## 2022-03-24 ENCOUNTER — OFFICE VISIT (OUTPATIENT)
Dept: CARDIOLOGY | Facility: CLINIC | Age: 67
End: 2022-03-24
Attending: FAMILY MEDICINE
Payer: COMMERCIAL

## 2022-03-24 VITALS
OXYGEN SATURATION: 95 % | BODY MASS INDEX: 29.06 KG/M2 | SYSTOLIC BLOOD PRESSURE: 138 MMHG | HEIGHT: 60 IN | WEIGHT: 148 LBS | DIASTOLIC BLOOD PRESSURE: 78 MMHG | HEART RATE: 60 BPM

## 2022-03-24 DIAGNOSIS — I77.810 ASCENDING AORTA DILATION (H): Primary | ICD-10-CM

## 2022-03-24 DIAGNOSIS — I27.20 PULMONARY HYPERTENSION (H): ICD-10-CM

## 2022-03-24 PROCEDURE — 99204 OFFICE O/P NEW MOD 45 MIN: CPT | Performed by: INTERNAL MEDICINE

## 2022-03-24 NOTE — PATIENT INSTRUCTIONS
1. CT angiogram of chest/aorta.    2. Follow up with Dr. Lomeli - video visit.    If you have any questions or concerns, please call my nurse Ana Clark at [phone].

## 2022-03-24 NOTE — PROGRESS NOTES
Service Date: 03/24/2022    PRIMARY CARE AND REFERRING PROVIDER:  Ashely Fregoso MD     REASONS FOR VISIT:    1.  Mild ascending aorta dilatation of 4.5 cm on recent stress echocardiogram.  2.  Question of pulmonary hypertension based on echocardiogram RVSP.    HISTORY OF PRESENT ILLNESS:    Ping Guerrero is new to Cardiology.  She is a 66-year-old lady of [place] heritage who moved from [place] to [place] 2 [place] ago and lives with her daughter, who accompanied her today.  Her daughter is a  in a restaurant.  The patient does not speak any English, and [facility] .  Her daughter speaks some English.    Prior to moving to the [place], Ping has been in good health.  She has never seen any medical provider or had any testing or prescription medications before.    On 01/20/2022, she went to the urgent care clinic with some nausea.  She was concerned that she might have COVID.  In the urgent care clinic, her ECG was done, which showed sinus rhythm with anterolateral T-wave inversion.  Therefore, the patient was sent to the emergency room where her workup was essentially unremarkable.  ECG was unchanged, serial high-sensitivity troponin was negative at 4, hemoglobin was 11.7, creatinine normal.  The patient's COVID test was read as positive post discharge.    She was discharged with a stress echocardiogram, which was negative for ischemia at a high workload of 8.0 METs and above-average exercise capacity with normal blood pressure response and no symptoms.  The ascending aorta was noted to be moderately dilated at 4.5 cm, and the tricuspid valve was normal with a mildly elevated RVSP of 33 mmHg plus right atrial pressure.  For both these reasons, she was referred here.    The patient denies any cardiovascular symptoms.  There is a paucity of medical history due to no regular care, but there does not appear to be any relevant family history.   She does not use tobacco products.  Her blood pressure is normal at 138/78 with a pulse of 60.  Weight is 148 pounds.  She is 4 feet 11 inches, and her BMI is 30 kg/m2.  Her LDL is 88, and creatinine is 0.58.  TSH is normal.    I personally reviewed her ECG and echocardiogram images.  Her stress test is normal.  She has normal LV mass and systolic function, EF of 65%. The aortic valve was not imaged on short axis, so I am unable to say if it was trileaflet.  She has trace to mild aortic valve regurgitation, no stenosis based on limited views.  Aortic root is normal.  Ascending aorta is dilated at 4.5 cm.  RV function is normal.  Her TR is trace to mild. MR is mild.  RVSP is only mildly elevated.    PHYSICAL EXAMINATION:    Pertinent for elevated BMI, but otherwise unremarkable.  JVP is normal.  Apical impulse undisplaced.  Heart sounds are regular.  Pulmonic component of second heart sound is normal.  There is no audible murmur.  Lungs are clear.  Extremities have no edema.  Abdomen is soft and nontender.    DIAGNOSES:    1.  Moderately dilated ascending aorta of 4.5 cm noted incidentally on recent stress echocardiogram.  2.  No significant pulmonary hypertension.    ASSESSMENT:    Her ascending aorta appears to be dilated.  Unfortunately, the aortic valve was not imaged in the short axis to comment on tricuspid versus bicuspid valve.  On limited images, there is no significant stenosis, and mild aortic valve regurgitation.  The aortic root appears normal.  We will get a confirmatory CT angiogram to assess aortopathy.    I do not think this lady has pulmonary hypertension or warrants additional testing.  Her RVSP is only mildly elevated, systemic blood pressure is normal, she does not have any other diseases, and right ventricular systolic function is normal.    PLAN:    1.  CT angiogram of the aorta.  2.  Followup video visit, as the daughter has to take time off work.    45 minutes needed    INTERPRETATION:   Moderate complexity.    cc:  Ashely Fregoso MD   [address] Lisa Lomeli MD        D: 2022   T: 2022   MT: jocelyn    Name:     HATTIE BURT  MRN:      [id number]        Account:      [id number]   :      1955           Service Date: 2022       Document: [id number]

## 2022-03-24 NOTE — LETTER
3/24/2022    Ashely Fregoso MD  [address] [facility] [address]    RE: Ping Guerrero       Dear Colleague,     I had the pleasure of seeing Ping Guerrero in the [facility].  Clinic visit note dictated. Dictation reference number - [id number]        Today's clinic visit entailed:  The following tests were independently interpreted by me as noted in my documentation: ECG, labs, stress echocardiogram.  Ordering of each unique test  45 minutes spent on the date of the encounter doing chart review, history and exam, documentation and further activities per the note  Provider  Link to [facility] Help Grid     The level of medical decision making during this visit was of moderate complexity.        REVIEW OF SYSTEMS:  A comprehensive 10-point review of systems was completed and the pertinent positives are documented in the history of present illness.    Skin:  Negative     Eyes:  Negative    ENT:  Negative    Respiratory:  Negative    Cardiovascular:  Negative    Gastroenterology: Positive for heartburn  Genitourinary:  Negative    Musculoskeletal:  Positive for joint pain  Neurologic:  Positive for numbness or tingling of hands  Psychiatric:  Negative    Heme/Lymph/Imm:  Negative    Endocrine:  Negative      CURRENT MEDICATIONS:  Current Outpatient Medications   Medication Sig Dispense Refill     calcium carb-cholecalciferol (OS-JUDY) 500-200 MG-UNIT tablet Take 1 tablet by mouth 2 times daily (with meals) 180 tablet 3     Multiple Vitamin (MULTIVITAMIN PO) Take 1 tablet by mouth daily           ALLERGIES:  No Known Allergies    PAST MEDICAL HISTORY:    Past Medical History:   Diagnosis Date     Nonsenile cataract 03/2019    Right eye only       PAST SURGICAL HISTORY:    Past Surgical History:   Procedure Laterality Date     CATARACT IOL, RT/LT Bilateral 02/2020     HC ECP WITH CATARACT SURGERY Right 03/15/2019    In [place]       FAMILY HISTORY:    Family History   Problem Relation Age of Onset  "    No Known Problems Mother      No Known Problems Father        SOCIAL HISTORY:    Social History     Socioeconomic History     Marital status: Single     Spouse name: None     Number of children: None     Years of education: None     Highest education level: None   Occupational History     None   Tobacco Use     Smoking status: Never Smoker     Smokeless tobacco: Never Used   Substance and Sexual Activity     Alcohol use: Never     Drug use: Never     Sexual activity: Not Currently   Other Topics Concern     Parent/sibling w/ CABG, MI or angioplasty before 65F 55M? Not Asked   Social History Narrative     None     Social Determinants of Health     Financial Resource Strain: Low Risk      Difficulty of Paying Living Expenses: Not hard at all   Food Insecurity: No Food Insecurity     Worried About Running Out of Food in the Last Year: Never true     Ran Out of Food in the Last Year: Never true   Transportation Needs: Unknown     Lack of Transportation (Medical): No     Lack of Transportation (Non-Medical): Not on file   Physical Activity: Not on file   Stress: Not on file   Social Connections: Not on file   Intimate Partner Violence: Not on file   Housing Stability: Not on file       PHYSICAL EXAM:    Vitals: /78 (BP Location: Right arm, Patient Position: Sitting, Cuff Size: Adult Regular)   Pulse 60   Ht 1.511 m (4' 11.5\")   Wt 67.1 kg (148 lb)   SpO2 95%   BMI 29.39 kg/m    Wt Readings from Last 5 Encounters:   03/24/22 67.1 kg (148 lb)   02/28/22 64 kg (141 lb)   01/28/22 64.4 kg (142 lb)   09/24/20 65.3 kg (144 lb)   01/13/20 61.7 kg (136 lb)             Encounter Diagnoses   Name Primary?     Ascending aorta dilation (H) Yes     Pulmonary hypertension - mild (H)        Orders Placed This Encounter   Procedures     CT Chest Angio w/o & w Contrast     Follow-Up with Cardiology                       Thank you for allowing me to participate in the care of your patient.      Sincerely,     Avani Gonzalez " MD [employer]     [facility]  cc:   Referred Self, MD  No address on file

## 2022-03-24 NOTE — PROGRESS NOTES
Clinic visit note dictated. Dictation reference number - [id number]        Today's clinic visit entailed:  The following tests were independently interpreted by me as noted in my documentation: ECG, labs, stress echocardiogram.  Ordering of each unique test  45 minutes spent on the date of the encounter doing chart review, history and exam, documentation and further activities per the note  Provider  Link to [facility] Help Grid     The level of medical decision making during this visit was of moderate complexity.        REVIEW OF SYSTEMS:  A comprehensive 10-point review of systems was completed and the pertinent positives are documented in the history of present illness.    Skin:  Negative     Eyes:  Negative    ENT:  Negative    Respiratory:  Negative    Cardiovascular:  Negative    Gastroenterology: Positive for heartburn  Genitourinary:  Negative    Musculoskeletal:  Positive for joint pain  Neurologic:  Positive for numbness or tingling of hands  Psychiatric:  Negative    Heme/Lymph/Imm:  Negative    Endocrine:  Negative      CURRENT MEDICATIONS:  Current Outpatient Medications   Medication Sig Dispense Refill     calcium carb-cholecalciferol (OS-JUDY) 500-200 MG-UNIT tablet Take 1 tablet by mouth 2 times daily (with meals) 180 tablet 3     Multiple Vitamin (MULTIVITAMIN PO) Take 1 tablet by mouth daily           ALLERGIES:  No Known Allergies    PAST MEDICAL HISTORY:    Past Medical History:   Diagnosis Date     Nonsenile cataract 03/2019    Right eye only       PAST SURGICAL HISTORY:    Past Surgical History:   Procedure Laterality Date     CATARACT IOL, RT/LT Bilateral 02/2020     HC ECP WITH CATARACT SURGERY Right 03/15/2019    In [place]       FAMILY HISTORY:    Family History   Problem Relation Age of Onset     No Known Problems Mother      No Known Problems Father        SOCIAL HISTORY:    Social History     Socioeconomic History     Marital status: Single     Spouse name: None     Number of children: None      "Years of education: None     Highest education level: None   Occupational History     None   Tobacco Use     Smoking status: Never Smoker     Smokeless tobacco: Never Used   Substance and Sexual Activity     Alcohol use: Never     Drug use: Never     Sexual activity: Not Currently   Other Topics Concern     Parent/sibling w/ CABG, MI or angioplasty before 65F 55M? Not Asked   Social History Narrative     None     Social Determinants of Health     Financial Resource Strain: Low Risk      Difficulty of Paying Living Expenses: Not hard at all   Food Insecurity: No Food Insecurity     Worried About Running Out of Food in the Last Year: Never true     Ran Out of Food in the Last Year: Never true   Transportation Needs: Unknown     Lack of Transportation (Medical): No     Lack of Transportation (Non-Medical): Not on file   Physical Activity: Not on file   Stress: Not on file   Social Connections: Not on file   Intimate Partner Violence: Not on file   Housing Stability: Not on file       PHYSICAL EXAM:    Vitals: /78 (BP Location: Right arm, Patient Position: Sitting, Cuff Size: Adult Regular)   Pulse 60   Ht 1.511 m (4' 11.5\")   Wt 67.1 kg (148 lb)   SpO2 95%   BMI 29.39 kg/m    Wt Readings from Last 5 Encounters:   03/24/22 67.1 kg (148 lb)   02/28/22 64 kg (141 lb)   01/28/22 64.4 kg (142 lb)   09/24/20 65.3 kg (144 lb)   01/13/20 61.7 kg (136 lb)             Encounter Diagnoses   Name Primary?     Ascending aorta dilation (H) Yes     Pulmonary hypertension - mild (H)        Orders Placed This Encounter   Procedures     CT Chest Angio w/o & w Contrast     Follow-Up with Cardiology                   "

## 2022-03-24 NOTE — PROGRESS NOTES
Pulmonary Hypertension Clinic  Provider:  Dr GAGAN Lomeli  Visit Date:    3/24/2022  Location:   [place]  Review of After Visit Summary (AVS) / Instructions from provider:   The AVS and instructions from the provider were reviewed in detail with the patient following their office visit with Dr. Lomeli.   Family present: Daughter   not available to nurse at time of review of AVS.  Patient Daughter verbalized understanding of the information and stated agreement to the plan. Instructed patient to call with any questions or concerns.   Return appointment:   Patient and daughter were walked to scheduling to set up appointments.  Future Appointments   Date Time Provider Department Center   4/5/2022 11:40 AM [facility]1 [facility]   5/3/2022  8:45 AM Avani Lomeli MD [facility]IN   ADEEL Vargas, BSN, RN  RN Care Coordinator  [facility] and [place] locations  [phone]

## 2022-03-24 NOTE — LETTER
3/24/2022      RE: Ping Guerrero  [address] [facility] [address]           Today's clinic visit entailed:  The following tests were independently interpreted by me as noted in my documentation: ECG, labs, stress echocardiogram.  Ordering of each unique test  45 minutes spent on the date of the encounter doing chart review, history and exam, documentation and further activities per the note  Provider  Link to [facility] Help Grid     The level of medical decision making during this visit was of moderate complexity.        REVIEW OF SYSTEMS:  A comprehensive 10-point review of systems was completed and the pertinent positives are documented in the history of present illness.    Skin:  Negative     Eyes:  Negative    ENT:  Negative    Respiratory:  Negative    Cardiovascular:  Negative    Gastroenterology: Positive for heartburn  Genitourinary:  Negative    Musculoskeletal:  Positive for joint pain  Neurologic:  Positive for numbness or tingling of hands  Psychiatric:  Negative    Heme/Lymph/Imm:  Negative    Endocrine:  Negative      CURRENT MEDICATIONS:  Current Outpatient Medications   Medication Sig Dispense Refill     calcium carb-cholecalciferol (OS-JUDY) 500-200 MG-UNIT tablet Take 1 tablet by mouth 2 times daily (with meals) 180 tablet 3     Multiple Vitamin (MULTIVITAMIN PO) Take 1 tablet by mouth daily           ALLERGIES:  No Known Allergies    PAST MEDICAL HISTORY:    Past Medical History:   Diagnosis Date     Nonsenile cataract 03/2019    Right eye only       PAST SURGICAL HISTORY:    Past Surgical History:   Procedure Laterality Date     CATARACT IOL, RT/LT Bilateral 02/2020     HC ECP WITH CATARACT SURGERY Right 03/15/2019    In [place]       FAMILY HISTORY:    Family History   Problem Relation Age of Onset     No Known Problems Mother      No Known Problems Father        SOCIAL HISTORY:    Social History     Socioeconomic History     Marital status: Single     Spouse name: None     Number of children:  "None     Years of education: None     Highest education level: None   Occupational History     None   Tobacco Use     Smoking status: Never Smoker     Smokeless tobacco: Never Used   Substance and Sexual Activity     Alcohol use: Never     Drug use: Never     Sexual activity: Not Currently   Other Topics Concern     Parent/sibling w/ CABG, MI or angioplasty before 65F 55M? Not Asked   Social History Narrative     None     Social Determinants of Health     Financial Resource Strain: Low Risk      Difficulty of Paying Living Expenses: Not hard at all   Food Insecurity: No Food Insecurity     Worried About Running Out of Food in the Last Year: Never true     Ran Out of Food in the Last Year: Never true   Transportation Needs: Unknown     Lack of Transportation (Medical): No     Lack of Transportation (Non-Medical): Not on file   Physical Activity: Not on file   Stress: Not on file   Social Connections: Not on file   Intimate Partner Violence: Not on file   Housing Stability: Not on file       PHYSICAL EXAM:    Vitals: /78 (BP Location: Right arm, Patient Position: Sitting, Cuff Size: Adult Regular)   Pulse 60   Ht 1.511 m (4' 11.5\")   Wt 67.1 kg (148 lb)   SpO2 95%   BMI 29.39 kg/m    Wt Readings from Last 5 Encounters:   03/24/22 67.1 kg (148 lb)   02/28/22 64 kg (141 lb)   01/28/22 64.4 kg (142 lb)   09/24/20 65.3 kg (144 lb)   01/13/20 61.7 kg (136 lb)             Encounter Diagnoses   Name Primary?     Ascending aorta dilation (H) Yes     Pulmonary hypertension - mild (H)        Orders Placed This Encounter   Procedures     CT Chest Angio w/o & w Contrast     Follow-Up with Cardiology       Service Date: 03/24/2022    PRIMARY CARE AND REFERRING PROVIDER:  Ashely Fregoso MD     REASONS FOR VISIT:    1.  Mild ascending aorta dilatation of 4.5 cm on recent stress echocardiogram.  2.  Question of pulmonary hypertension based on echocardiogram RVSP.    HISTORY OF PRESENT ILLNESS:    Ping Guerrero is new " to Cardiology.  She is a 66-year-old lady of [place] heritage who moved from [place] to [place] 2 [place] ago and lives with her daughter, who accompanied her today.  Her daughter is a  in a restaurant.  The patient does not speak any English, and consultation was via official [facility] .  Her daughter speaks some English.    Prior to moving to the [place], Ping has been in good health.  She has never seen any medical provider or had any testing or prescription medications before.    On 01/20/2022, she went to the urgent care clinic with some nausea.  She was concerned that she might have COVID.  In the urgent care clinic, her ECG was done, which showed sinus rhythm with anterolateral T-wave inversion.  Therefore, the patient was sent to the emergency room where her workup was essentially unremarkable.  ECG was unchanged, serial high-sensitivity troponin was negative at 4, hemoglobin was 11.7, creatinine normal.  The patient's COVID test was read as positive post discharge.    She was discharged with a stress echocardiogram, which was negative for ischemia at a high workload of 8.0 METs and above-average exercise capacity with normal blood pressure response and no symptoms.  The ascending aorta was noted to be moderately dilated at 4.5 cm, and the tricuspid valve was normal with a mildly elevated RVSP of 33 mmHg plus right atrial pressure.  For both these reasons, she was referred here.    The patient denies any cardiovascular symptoms.  There is a paucity of medical history due to no regular care, but there does not appear to be any relevant family history.  She does not use tobacco products.  Her blood pressure is normal at 138/78 with a pulse of 60.  Weight is 148 pounds.  She is 4 feet 11 inches, and her BMI is 30 kg/m2.  Her LDL is 88, and creatinine is 0.58.  TSH is normal.    I personally reviewed her ECG and echocardiogram images.  Her stress test is normal.  She has  normal LV mass and systolic function, EF of 65%. The aortic valve was not imaged on short axis, so I am unable to say if it was trileaflet.  She has trace to mild aortic valve regurgitation, no stenosis based on limited views.  Aortic root is normal.  Ascending aorta is dilated at 4.5 cm.  RV function is normal.  Her TR is trace to mild. MR is mild.  RVSP is only mildly elevated.    PHYSICAL EXAMINATION:    Pertinent for elevated BMI, but otherwise unremarkable.  JVP is normal.  Apical impulse undisplaced.  Heart sounds are regular.  Pulmonic component of second heart sound is normal.  There is no audible murmur.  Lungs are clear.  Extremities have no edema.  Abdomen is soft and nontender.    DIAGNOSES:    1.  Moderately dilated ascending aorta of 4.5 cm noted incidentally on recent stress echocardiogram.  2.  No significant pulmonary hypertension.    ASSESSMENT:    Her ascending aorta appears to be dilated.  Unfortunately, the aortic valve was not imaged in the short axis to comment on tricuspid versus bicuspid valve.  On limited images, there is no significant stenosis, and mild aortic valve regurgitation.  The aortic root appears normal.  We will get a confirmatory CT angiogram to assess aortopathy.    I do not think this lady has pulmonary hypertension or warrants additional testing.  Her RVSP is only mildly elevated, systemic blood pressure is normal, she does not have any other diseases, and right ventricular systolic function is normal.    PLAN:    1.  CT angiogram of the aorta.  2.  Followup video visit, as the daughter has to take time off work.    45 minutes needed    INTERPRETATION:  Moderate complexity.    cc:  Ashely Fregoso MD   [address] Lisa Lomeli MD        D: 2022   T: 2022   MT: jocelyn    Name:     HATTIE BURT  MRN:      [id number]        Account:      [id number]   :      1955           Service  Date: 03/24/2022       Document: [id number]

## 2022-04-05 ENCOUNTER — HOSPITAL ENCOUNTER (OUTPATIENT)
Dept: CT IMAGING | Facility: CLINIC | Age: 67
Discharge: HOME | End: 2022-04-05
Attending: INTERNAL MEDICINE | Admitting: INTERNAL MEDICINE
Payer: COMMERCIAL

## 2022-04-05 DIAGNOSIS — I77.810 ASCENDING AORTA DILATION (H): ICD-10-CM

## 2022-04-05 DIAGNOSIS — I27.20 PULMONARY HYPERTENSION (H): ICD-10-CM

## 2022-04-05 PROCEDURE — 71275 CT ANGIOGRAPHY CHEST: CPT

## 2022-04-05 PROCEDURE — 250N000009 HC RX 250: Performed by: INTERNAL MEDICINE

## 2022-04-05 PROCEDURE — 250N000011 HC RX IP 250 OP 636: Performed by: INTERNAL MEDICINE

## 2022-04-05 RX ORDER — IOPAMIDOL 755 MG/ML
500 INJECTION, SOLUTION INTRAVASCULAR ONCE
Status: COMPLETED | OUTPATIENT
Start: 2022-04-05 | End: 2022-04-05

## 2022-04-05 RX ADMIN — IOPAMIDOL 80 ML: 755 INJECTION, SOLUTION INTRAVENOUS at 11:19

## 2022-04-05 RX ADMIN — SODIUM CHLORIDE 80 ML: 9 INJECTION, SOLUTION INTRAVENOUS at 11:19

## 2022-05-04 ENCOUNTER — TELEPHONE (OUTPATIENT)
Dept: CARDIOLOGY | Facility: CLINIC | Age: 67
End: 2022-05-04
Payer: COMMERCIAL

## 2022-05-04 NOTE — TELEPHONE ENCOUNTER
Dr. Lomeli received CT imaging test.   Result letter mailed to Patient. And copy was sent to PCP.

## 2022-05-04 NOTE — LETTER
May 4, 2022       TO: Ping Guerrero   [address] [facility] [address]       Dear Ms. Guerrero,    The results of your recent imaging test were reviewed by Dr. Lomeli,     CT angiogram results reviewed.  It shows that the ascending aorta is mildly dilated at 4.1 X 4.0 cm.  The dimension of 4.5 cm on the exercise echocardiogram was overestimated.     There is a small 3 mm lung nodule noted (incidental finding).  If high risk factors, advised follow-up imaging in 12 months.  This will be at the discretion of the patient's primary provider.     RECOMMENDATIONS:  1.  Follow-up transthoracic echocardiogram in 2 years.  This can be done by patient's PCP.  2.  Follow-up with cardiology, as needed.  3.  Results letter will be mailed to the patient's home address.     Dr. GAGAN Lomeli MD [facility]  Cardiology      Sincerely,    [facility]

## 2022-06-01 ENCOUNTER — OFFICE VISIT (OUTPATIENT)
Dept: FAMILY MEDICINE | Facility: CLINIC | Age: 67
End: 2022-06-01
Payer: COMMERCIAL

## 2022-06-01 VITALS
TEMPERATURE: 98.8 F | WEIGHT: 145.1 LBS | HEART RATE: 72 BPM | DIASTOLIC BLOOD PRESSURE: 74 MMHG | RESPIRATION RATE: 24 BRPM | OXYGEN SATURATION: 95 % | SYSTOLIC BLOOD PRESSURE: 120 MMHG | BODY MASS INDEX: 28.82 KG/M2

## 2022-06-01 DIAGNOSIS — Z12.11 SPECIAL SCREENING FOR MALIGNANT NEOPLASMS, COLON: ICD-10-CM

## 2022-06-01 DIAGNOSIS — K04.7 INFECTED TOOTH: Primary | ICD-10-CM

## 2022-06-01 DIAGNOSIS — I77.810 ASCENDING AORTA DILATION (H): ICD-10-CM

## 2022-06-01 PROCEDURE — 99213 OFFICE O/P EST LOW 20 MIN: CPT | Performed by: FAMILY MEDICINE

## 2022-06-01 ASSESSMENT — ENCOUNTER SYMPTOMS: BREAST MASS: 0

## 2022-06-01 ASSESSMENT — ACTIVITIES OF DAILY LIVING (ADL): CURRENT_FUNCTION: NO ASSISTANCE NEEDED

## 2022-06-01 ASSESSMENT — PAIN SCALES - GENERAL: PAINLEVEL: MODERATE PAIN (4)

## 2022-06-01 NOTE — PROGRESS NOTES
"  Assessment & Plan     Infected tooth  Will treat, highly recommended   - amoxicillin-clavulanate (AUGMENTIN) 875-125 MG tablet; Take 1 tablet by mouth 2 times daily for 10 days    Ascending aorta dilation (H)  Reviewed with daughter and patient via daughter, recheck in 2 yrs    Special screening for malignant neoplasms, colon  Discussed all options with patient, pros and cons. Patient in agreement with this plan.  agrees  - [facility]([employer])    Ordering of each unique test  Prescription drug management             Return in about 9 months (around 3/1/2023) for Preventive Visit.    Polina Mckeon MD  [facility] SUNIL Feng is a 66 year old who presents for the following health issues  accompanied by her daughter.  Her daughter is interpreting for her today.  Her daughter answers most questions, and needing to remind daughter to please interpret and ask her mother these questions.    Healthy Habits:     In general, how would you rate your overall health?  Fair    Frequency of exercise:  1 day/week    Duration of exercise:  Less than 15 minutes    Do you usually eat at least 4 servings of fruit and vegetables a day, include whole grains    & fiber and avoid regularly eating high fat or \"junk\" foods?  Yes    Taking medications regularly:  Yes    Ability to successfully perform activities of daily living:  No assistance needed    Home Safety:  No safety concerns identified    Hearing Impairment:  No hearing concerns    In the past 6 months, have you been bothered by leaking of urine?  No    In general, how would you rate your overall mental or emotional health?  Good      PHQ-2 Total Score: 0    Additional concerns today:  Yes     Concern - LEFT SIDED CHEEK AND JAW PAIN   Onset: 2 DAYS   Description: LEFT SIDED CHEEK AND JAW PAIN   Intensity: moderate, 4/10  Progression of Symptoms:  same  Accompanying Signs & Symptoms: SWELLING and upper tooth pain  Previous history " of similar problem: YES   Precipitating factors:        Worsened by: nothing   Alleviating factors:        Improved by: nothing   Therapies tried and outcome:  none   Will not go to dentist, feels her tooth will just fall out eventually and does not want to go.    Ascending aorta dilation, reviewed cards notes with daughter and needs to have this rechecked in 2 yrs, per notes.    Colon cancer screening discussed, all options, colonoscopy is not an option per daughter    Review of Systems   Breasts:  Negative for tenderness, breast mass and discharge.   Genitourinary: Negative for pelvic pain, vaginal bleeding and vaginal discharge.      CONSTITUTIONAL: NEGATIVE for fever, chills, change in weight  ENT/MOUTH: NEGATIVE for ear, mouth and throat problems  RESP: NEGATIVE for significant cough or SOB  CV: NEGATIVE for chest pain, palpitations or peripheral edema      Objective    /74 (BP Location: Right arm, Patient Position: Sitting, Cuff Size: Adult Regular)   Pulse 72   Temp 98.8  F (37.1  C) (Oral)   Resp 24   Wt 65.8 kg (145 lb 1.6 oz)   SpO2 95%   BMI 28.82 kg/m    Body mass index is 28.82 kg/m .  Physical Exam  Constitutional:       Appearance: Normal appearance.   HENT:      Nose: Nose normal.      Mouth/Throat:      Mouth: Mucous membranes are moist.   Lymphadenopathy:      Cervical: No cervical adenopathy.   Skin:     General: Skin is warm and dry.   Neurological:      Mental Status: She is alert and oriented to person, place, and time.        GENERAL: healthy, alert and no distress  HENT: normal cephalic/atraumatic, ear canals and TM's normal and left upper molar is painful to touch and tap, poor dentition noted through out, no redness or drainage, sinus is not tender, no swelling or LN noted  NECK: no adenopathy, no asymmetry, masses, or scars and thyroid normal to palpation  RESP: lungs clear to auscultation - no rales, rhonchi or wheezes  CV: regular rate and rhythm, normal S1 S2, no S3 or S4,  no murmur, click or rub, no peripheral edema and peripheral pulses strong  MS: no gross musculoskeletal defects noted, no edema  SKIN: no suspicious lesions or rashes  NEURO: Normal strength and tone, mentation intact and speech normal

## 2022-06-03 ENCOUNTER — VIRTUAL VISIT (OUTPATIENT)
Dept: CARDIOLOGY | Facility: CLINIC | Age: 67
End: 2022-06-03
Attending: INTERNAL MEDICINE
Payer: COMMERCIAL

## 2022-06-03 DIAGNOSIS — I77.810 ASCENDING AORTA DILATION (H): Primary | ICD-10-CM

## 2022-06-03 PROCEDURE — 99213 OFFICE O/P EST LOW 20 MIN: CPT | Mod: 95 | Performed by: INTERNAL MEDICINE

## 2022-06-03 NOTE — LETTER
6/3/2022    Ashely Fregoso MD  [address]  [facility] [address]    RE: Ping Guerrero       Dear Colleague,     I had the pleasure of seeing Ping Guerrero in the [facility].  Ping is a 66 year old who is being evaluated via a billable telephone visit.      [id number] DICTATION ID    What phone number would you like to be contacted at? [phone]  How would you like to obtain your AVS? Mail a copy  Phone call duration: 25 minutes  Vitals - Patient Reported  Systolic (Patient Reported):  (n/a)  Diastolic (Patient Reported):  (n/a)  Weight (Patient Reported): 65.8 kg (145 lb)  Pulse (Patient Reported):  (n/a)    Dipti Fu LPN      Service Date: 06/03/2022    TELEPHONE VISIT    PRIMARY CARE PROVIDER:  Dr. Ashely Fregoso MD    REASON FOR VISIT:    1.  Followup of ascending aorta dilatation with concomitant retesting.    HISTORY OF PRESENT ILLNESS:  It was my pleasure to connect with Ping Guerrero and her daughter through a telephone visit today.    Ping speaks only Mandarin and communication was via a 3-way phone call with an official [place] [employer] .    Ping is 66 years, moved to the [place] 2 years ago in [place], lives with her daughter, is not known to have any chronic medical illnesses and does not take any prescription medications.    She recently underwent a stress echocardiogram for abnormal ECG findings and a negative troponin, and the stress test was negative for ischemia, but it incidentally showed a moderately dilated ascending aorta of 4.5 cm.  Aortic valve is trileaflet without significant regurgitation.    I had requested a CT angiogram of the aorta, which she completed.  I personally reviewed the test and images.  Her ascending aorta is only mildly dilated at 4.1 x 4.0 cm.  Aortic root is 3.7 cm, ST junction, aortic arch and descending thoracic aorta are normal.  She had a small 3 mm right lung nodule of doubtful clinical  significance.  Nonsmoker.    DIAGNOSES:    1.  Mild to moderate ascending aorta dilatation.    Although the aortic dimension itself is only 4.1 x 4 cm, patient's body surface area is small at 1.6 m2.  She is 4 feet 11 inches tall and weighs 148 pounds, so it is closer to moderately dilated.  She is not hypertensive, BP is excellent, she has a normal trileaflet aortic valve, no family history of aortopathy.    I recommend surveillance imaging.    PLAN:    1.  Get a transthoracic echocardiogram for surveillance in 1 year and follow up with me at the [place] office.    Total time today 25 minutes.  That included communication via Mandarin  in the context of the patient's low health literacy.    Avani Lomeli MD        D: 2022   T: 2022   MT: salvatore    Name:     HATTIE BURT  MRN:      [id number]        Account:      [id number]   :      1955           Service Date: 2022       Document: [id number]      Thank you for allowing me to participate in the care of your patient.      Sincerely,     Avani Lomeli MD     [facility]  cc:   Avani Lomeli MD  [address] [place] [id number]

## 2022-06-03 NOTE — PROGRESS NOTES
Ping is a 66 year old who is being evaluated via a billable telephone visit.      [id number] DICTATION ID    What phone number would you like to be contacted at? [phone]  How would you like to obtain your AVS? Mail a copy  Phone call duration: 25 minutes  Vitals - Patient Reported  Systolic (Patient Reported):  (n/a)  Diastolic (Patient Reported):  (n/a)  Weight (Patient Reported): 65.8 kg (145 lb)  Pulse (Patient Reported):  (n/a)    Dipti Fu LPN

## 2022-06-03 NOTE — PROGRESS NOTES
Service Date: 06/03/2022    TELEPHONE VISIT    PRIMARY CARE PROVIDER:  Dr. Ashely Fregoso MD    REASON FOR VISIT:    1.  Followup of ascending aorta dilatation with concomitant retesting.    HISTORY OF PRESENT ILLNESS:  It was my pleasure to connect with Ping Guerrero and her daughter through a telephone visit today.    Ping speaks only Mandarin and communication was via a 3-way phone call with an official [place] . Ping is 66 years, moved to the [place] 2 [place] ago in [place], lives with her daughter, is not known to have any chronic medical illnesses and does not take any prescription medications.    She recently underwent a stress echocardiogram for abnormal ECG findings and a negative troponin, and the stress test was negative for ischemia, but it incidentally showed a moderately dilated ascending aorta of 4.5 cm.  Aortic valve is trileaflet without significant regurgitation.    I reviewed the images of the CT angiogram of the aorta.  Her ascending aorta is only mildly dilated at 4.1 x 4.0 cm.  Aortic root is 3.7 cm, ST junction, aortic arch and descending thoracic aorta are normal.  She had a small 3 mm right lung nodule of doubtful clinical significance.  Nonsmoker.    DIAGNOSES:    1.  Mild to moderate ascending aorta dilatation.  Although the aortic dimension itself is only 4.1 x 4 cm, patient's body surface area is small at 1.6 m2.  She is 4 feet 11 inches tall and weighs 148 pounds, so it is closer to moderately dilated.  She is not hypertensive, BP is excellent, she has a normal trileaflet aortic valve, no family history of aortopathy.  I recommend surveillance imaging.    PLAN:    1.  Get a transthoracic echocardiogram for surveillance in 1 year and follow up with me at the [place] office.    Total time today 25 minutes.  That included communication via Mandarin  in the context of the patient's low health literacy.    Avani Lomeli MD        D:  2022   T: 2022   MT: salvatore    Name:     HATTIE BURT  MRN:      [id number]        Account:      [id number]   :      1955           Service Date: 2022       Document: [id number]

## 2023-02-22 NOTE — PROGRESS NOTES
Clinic Care Coordination Contact  [facility] [[email or web address]]/[email or web address]    Referral Source: PCP  Clinical Data: Care Coordinator Outreach  Outreach attempted x 1.  Left message on patient's voicemail with call back information and requested return call.  Plan: Care Coordinator will send care coordination introduction letter with care coordinator contact information and explanation of care coordination services via mail. Care Coordinator will try to reach patient again in 1-2 business days.    LAMBERTO Paz   Care Coordination Team  [phone]       Critical result received of Hgb of 6.9 MD [place] aware. Order received for type/screen, and repeat H&H in AM. No other orders at this time. Lab aware.

## 2023-05-08 ENCOUNTER — HOSPITAL ENCOUNTER (OUTPATIENT)
Dept: CARDIOLOGY | Facility: CLINIC | Age: 68
Discharge: HOME | End: 2023-05-08
Attending: INTERNAL MEDICINE | Admitting: INTERNAL MEDICINE
Payer: COMMERCIAL

## 2023-05-08 ENCOUNTER — TELEPHONE (OUTPATIENT)
Dept: CARDIOLOGY | Facility: CLINIC | Age: 68
End: 2023-05-08

## 2023-05-08 DIAGNOSIS — I77.810 ASCENDING AORTA DILATION (H): ICD-10-CM

## 2023-05-08 DIAGNOSIS — I77.810 ASCENDING AORTA DILATION (H): Primary | ICD-10-CM

## 2023-05-08 PROCEDURE — 93306 TTE W/DOPPLER COMPLETE: CPT

## 2023-05-08 PROCEDURE — 93306 TTE W/DOPPLER COMPLETE: CPT | Mod: 26 | Performed by: INTERNAL MEDICINE

## 2023-05-08 NOTE — TELEPHONE ENCOUNTER
Echo completed 0today as below, done for annual monitoring of ascending aortic dilation. Follow up is not until September. Routed to provider to review.     Left ventricular systolic function is normal.The visual ejection fraction is 60-65%.  The right ventricular systolic function is normal.  There is mild (1+) mitral regurgitation.  The ascending aorta is moderately dilated- 4.6 cm  The inferior vena cava was normal in size with preserved respiratory variability.  Echo dated 02/15/2022 ascending aorta 4.5 cm.

## 2023-05-08 NOTE — TELEPHONE ENCOUNTER
Via  Services, patient's called with recommendation of CT Chest angiogram.   Patient agrees with plan but [place] and returning in early Sept.    CT Chest Angio w/o contrast  -   Order placed, Scheduling messaged to schedule for Sept.     Last CTA Chest angio w/o contrast -  CTA Chest -   done 4-5-22.     Dr. Lomeli's reply - Even last time, the echocardiogram overestimated the ascending aorta.     Please have her get a CT angiogram of the thoracic aorta prior to her return visit.  Not urgent.     Thank you.     Dr. Lomeli

## 2023-05-16 ENCOUNTER — LAB (OUTPATIENT)
Dept: FAMILY MEDICINE | Facility: CLINIC | Age: 68
End: 2023-05-16

## 2023-05-16 ENCOUNTER — OFFICE VISIT (OUTPATIENT)
Dept: FAMILY MEDICINE | Facility: CLINIC | Age: 68
End: 2023-05-16
Payer: COMMERCIAL

## 2023-05-16 VITALS
HEIGHT: 59 IN | DIASTOLIC BLOOD PRESSURE: 70 MMHG | WEIGHT: 139 LBS | SYSTOLIC BLOOD PRESSURE: 110 MMHG | BODY MASS INDEX: 28.02 KG/M2 | OXYGEN SATURATION: 99 % | RESPIRATION RATE: 14 BRPM | TEMPERATURE: 97.6 F | HEART RATE: 58 BPM

## 2023-05-16 DIAGNOSIS — Z12.31 VISIT FOR SCREENING MAMMOGRAM: ICD-10-CM

## 2023-05-16 DIAGNOSIS — Z00.00 ROUTINE GENERAL MEDICAL EXAMINATION AT A HEALTH CARE FACILITY: Primary | ICD-10-CM

## 2023-05-16 DIAGNOSIS — Z12.11 SCREEN FOR COLON CANCER: ICD-10-CM

## 2023-05-16 DIAGNOSIS — I27.20 PULMONARY HYPERTENSION (H): ICD-10-CM

## 2023-05-16 DIAGNOSIS — I77.810 ASCENDING AORTA DILATION (H): ICD-10-CM

## 2023-05-16 PROCEDURE — 99397 PER PM REEVAL EST PAT 65+ YR: CPT | Mod: 25 | Performed by: PHYSICIAN ASSISTANT

## 2023-05-16 PROCEDURE — 0124A COVID-19 BIVALENT 12+ (PFIZER): CPT | Performed by: PHYSICIAN ASSISTANT

## 2023-05-16 PROCEDURE — 90677 PCV20 VACCINE IM: CPT | Performed by: PHYSICIAN ASSISTANT

## 2023-05-16 PROCEDURE — 36415 COLL VENOUS BLD VENIPUNCTURE: CPT | Performed by: PHYSICIAN ASSISTANT

## 2023-05-16 PROCEDURE — 90471 IMMUNIZATION ADMIN: CPT | Performed by: PHYSICIAN ASSISTANT

## 2023-05-16 PROCEDURE — 91312 COVID-19 BIVALENT 12+ (PFIZER): CPT | Performed by: PHYSICIAN ASSISTANT

## 2023-05-16 PROCEDURE — 80061 LIPID PANEL: CPT | Performed by: PHYSICIAN ASSISTANT

## 2023-05-16 PROCEDURE — 80048 BASIC METABOLIC PNL TOTAL CA: CPT | Performed by: PHYSICIAN ASSISTANT

## 2023-05-16 ASSESSMENT — ENCOUNTER SYMPTOMS
JOINT SWELLING: 0
PALPITATIONS: 0
HEADACHES: 0
DIARRHEA: 0
MYALGIAS: 0
HEARTBURN: 0
EYE PAIN: 0
FEVER: 0
ABDOMINAL PAIN: 0
NERVOUS/ANXIOUS: 0
ARTHRALGIAS: 0
FREQUENCY: 0
WEAKNESS: 0
DYSURIA: 0
CONSTIPATION: 0
COUGH: 0
BREAST MASS: 0
HEMATURIA: 0
CHILLS: 0
NAUSEA: 0
PARESTHESIAS: 0
SORE THROAT: 0
SHORTNESS OF BREATH: 0
DIZZINESS: 0
HEMATOCHEZIA: 0

## 2023-05-16 ASSESSMENT — PAIN SCALES - GENERAL: PAINLEVEL: NO PAIN (0)

## 2023-05-16 ASSESSMENT — ACTIVITIES OF DAILY LIVING (ADL): CURRENT_FUNCTION: NO ASSISTANCE NEEDED

## 2023-05-16 NOTE — PATIENT INSTRUCTIONS
Preventive Health Recommendations    See your health care provider every year to    Review health changes.     Discuss preventive care.      Review your medicines if your doctor has prescribed any.      You no longer need a yearly Pap test unless you've had an abnormal Pap test in the past 10 years. If you have vaginal symptoms, such as bleeding or discharge, be sure to talk with your provider about a Pap test.      Every 1 to 2 years, have a mammogram.  If you are over 69, talk with your health care provider about whether or not you want to continue having screening mammograms.      Every 10 years, have a colonoscopy. Or, have a yearly FIT test (stool test). These exams will check for colon cancer.       Have a cholesterol test every 5 years, or more often if your doctor advises it.       Have a diabetes test (fasting glucose) every three years. If you are at risk for diabetes, you should have this test more often.       At age 65, have a bone density scan (DEXA) to check for osteoporosis (brittle bone disease).    Shots:    Get a flu shot each year.    Get a tetanus shot every 10 years.    Talk to your doctor about your pneumonia vaccines. There are now two you should receive - Pneumovax (PPSV 23) and Prevnar (PCV 13).    Talk to your pharmacist about the shingles vaccine.    Talk to your doctor about the hepatitis B vaccine.    Nutrition:     Eat at least 5 servings of fruits and vegetables each day.      Eat whole-grain bread, whole-wheat pasta and brown rice instead of white grains and rice.      Get adequate about Calcium and Vitamin D.     Lifestyle    Exercise at least 150 minutes a week (30 minutes a day, 5 days a week). This will help you control your weight and prevent disease.      Limit alcohol to one drink per day.      No smoking.       Wear sunscreen to prevent skin cancer.       See your dentist twice a year for an exam and cleaning.      See your eye doctor every 1 to 2 years to screen for  conditions such as glaucoma, macular degeneration, cataracts, etc.    Personalized Prevention Plan  You are due for the preventive services outlined below.  Your care team is available to assist you in scheduling these services.  If you have already completed any of these items, please share that information with your care team to update in your medical record.    Health Maintenance Due   Topic Date Due     Colorectal Cancer Screening  Never done     COVID-19 Vaccine (5 - Pfizer series) 07/27/2022     Annual Wellness Visit  02/28/2023     ANNUAL REVIEW OF HM ORDERS  02/28/2023     Pneumococcal Vaccine (2 - PCV) 02/28/2023     Patient Education   Personalized Prevention Plan  You are due for the preventive services outlined below.  Your care team is available to assist you in scheduling these services.  If you have already completed any of these items, please share that information with your care team to update in your medical record.  Health Maintenance Due   Topic Date Due     Colorectal Cancer Screening  Never done     COVID-19 Vaccine (5 - Pfizer series) 07/27/2022     Annual Wellness Visit  02/28/2023     ANNUAL REVIEW OF HM ORDERS  02/28/2023     Pneumococcal Vaccine (2 - PCV) 02/28/2023       Exercise for a Healthier Heart  You may wonder how you can improve the health of your heart. If you re thinking about exercise, you re on the right track. You don t need to become an athlete. But you do need a certain amount of brisk exercise to help strengthen your heart. If you have been diagnosed with a heart condition, your healthcare provider may advise exercise to help your condition. To help make exercise a habit, choose safe, fun activities.      Exercise with a friend. When activity is fun, you're more likely to stick with it.     Before you start  Check with your healthcare provider before starting an exercise program. This is especially important if you haven't been active for a while. It's also important if  you have a long-term (chronic) health problem such as heart disease, diabetes, or obesity. Also check with your provider if you're at high risk for having these problems.   Why exercise?  Exercising regularly offers many healthy rewards. It can help you do all of these:     Improve your blood cholesterol level to help prevent further heart trouble.    Lower your blood pressure to help prevent a stroke or heart attack.    Control diabetes or reduce your risk of getting this disease.    Improve your heart and lung function.    Reach and stay at a healthy weight.    Make your muscles stronger so you can stay active.    Prevent falls and fractures by slowing the loss of bone mass (osteoporosis).    Manage stress better.    Improve your sense of self and your body image.  Exercise tips      Ease into your routine. Set small goals. Then build on them. Talk with your healthcare provider first before starting an exercise routine if you're not sure what your activity level should be.    Exercise on most days. Aim for a total of at least 150 minutes (2 hours and 30 minutes) or more of moderate-intensity aerobic activity each week. You could also do 75 minutes (1 hour and 15 minutes) or more of vigorous-intensity aerobic activity each week. Or try for a combination of both. Moderate activity means that you breathe heavier and your heart rate increases, but you can still talk. Think about doing at least 30 minutes of moderate exercise, 5 times a week. It's OK to work up to the 30-minute period over time. Examples of moderate-intensity activity are brisk walking, gardening, and water aerobics.    Step up your daily activity level.  Along with your exercise program, try being more active the whole day. Walk instead of drive. Or park further away so that you take more steps each day. Do more household tasks or yard work. You may not be able to meet the advised amount of physical activity. But doing some moderate- or  vigorous-intensity aerobic activity can help reduce your risk for heart disease. Your healthcare provider can help you figure out what is best for you.    Choose 1 or more activities you enjoy.  Walking is one of the easiest things you can do. You can also try swimming, riding a bike, dancing, or taking an exercise class.    Call 911  Call 911 right away if any of these occur:     Chest pain that doesn't go away quickly with rest    New burning, tightness, pressure, or heaviness in your chest, neck, shoulders, back, or arms    Abnormal or severe shortness of breath    A very fast or irregular heartbeat (palpitations)    Fainting  When to call your healthcare provider  Call your healthcare provider if you have any of these:     Dizziness or lightheadedness    Mild shortness of breath or chest pain    Increased or new joint or muscle pain    [employer] last reviewed this educational content on 7/1/2022 2000-2022 [employer]. All rights reserved. This information is not intended as a substitute for professional medical care. Always follow your healthcare professional's instructions.

## 2023-05-16 NOTE — PROGRESS NOTES
"SUBJECTIVE:   Ping is a 67 year old who presents for Preventive Visit.      5/16/2023     1:15 PM   Additional Questions   Roomed by MR   Accompanied by Daughter   via telephone         5/16/2023     1:15 PM   Patient Reported Additional Medications   Patient reports taking the following new medications NA   Patient has been advised of split billing requirements and indicates understanding: Yes     Are you in the first 12 months of your Medicare coverage?  Not on Medicare    Healthy Habits:     In general, how would you rate your overall health?  Good    Frequency of exercise:  1 day/week    Duration of exercise:  Less than 15 minutes    Do you usually eat at least 4 servings of fruit and vegetables a day, include whole grains    & fiber and avoid regularly eating high fat or \"junk\" foods?  Yes    Taking medications regularly:  Yes    Medication side effects:  None    Ability to successfully perform activities of daily living:  No assistance needed    Home Safety:  No safety concerns identified    Hearing Impairment:  No hearing concerns    In the past 6 months, have you been bothered by leaking of urine?  No    In general, how would you rate your overall mental or emotional health?  Good      PHQ-2 Total Score: 0    Additional concerns today:  No    Ascending aorta dilation, pulmonary HTN. Follows with cardiology. Had recent echo 5/8/23 and will be scheduling CTA chest. Appointment with cardiology scheduled for 9/2023.    Doesn't have a lot of motivation for exercise, prefers to watch television.   Lives with her daughter.      Have you ever done Advance Care Planning? (For example, a Health Directive, POLST, or a discussion with a medical provider or your loved ones about your wishes): No, advance care planning information given to patient to review.  Patient declined advance care planning discussion at this time.        Fall risk  Fallen 2 or more times in the past year?: No  Any fall with injury in " the past year?: No    Cognitive Screening   1) Repeat 3 items (Leader, Season, Table)    2) Clock draw: NORMAL  3) 3 item recall: Recalls 2 objects   Results: NORMAL clock, 1-2 items recalled: COGNITIVE IMPAIRMENT LESS LIKELY    Mini-CogTM Copyright DAPHNE Ojeda. Licensed by the author for use in [facility]; reprinted with permission (maynor@[facility]). All rights reserved.      Do you have sleep apnea, excessive snoring or daytime drowsiness?: Yes snoring    Reviewed and updated as needed this visit by clinical staff   Tobacco  Allergies  Meds  Problems  Med Hx  Surg Hx  Fam Hx          Reviewed and updated as needed this visit by Provider   Tobacco  Allergies  Meds  Problems  Med Hx  Surg Hx  Fam Hx         Social History     Tobacco Use     Smoking status: Never     Smokeless tobacco: Never   Vaping Use     Vaping status: Never Used   Substance Use Topics     Alcohol use: Never             5/16/2023    12:50 PM   Alcohol Use   Prescreen: >3 drinks/day or >7 drinks/week? No     Do you have a current opioid prescription? No  Do you use any other controlled substances or medications that are not prescribed by a provider? None        Current providers sharing in care for this patient include:   Patient Care Team:  Ashely Fregoso MD as PCP - General (Family Practice)  Ashely Fregoso MD as Assigned PCP  Avani Lomeli MD as Assigned Heart and Vascular Provider    The following health maintenance items are reviewed in Epic and correct as of today:  Health Maintenance   Topic Date Due     COLORECTAL CANCER SCREENING  Never done     COVID-19 Vaccine (5 - Pfizer series) 07/27/2022     Pneumococcal Vaccine: 65+ Years (2 - PCV) 02/28/2023     PAP FOLLOW-UP  09/24/2023     HPV FOLLOW-UP  09/24/2023     MAMMO SCREENING  09/27/2023     MEDICARE ANNUAL WELLNESS VISIT  05/16/2024     ANNUAL REVIEW OF HM ORDERS  05/16/2024     FALL RISK ASSESSMENT  05/16/2024     LIPID   02/28/2027     ADVANCE CARE PLANNING  05/16/2028     DTAP/TDAP/TD IMMUNIZATION (2 - Td or Tdap) 09/24/2030     DEXA  03/15/2032     HEPATITIS C SCREENING  Completed     PHQ-2 (once per calendar year)  Completed     INFLUENZA VACCINE  Completed     ZOSTER IMMUNIZATION  Completed     IPV IMMUNIZATION  Aged Out     MENINGITIS IMMUNIZATION  Aged Out     PAP  Discontinued     Lab work is in process  Labs reviewed in EPIC  BP Readings from Last 3 Encounters:   05/16/23 110/70   06/01/22 120/74   03/24/22 138/78    Wt Readings from Last 3 Encounters:   05/16/23 63 kg (139 lb)   06/01/22 65.8 kg (145 lb 1.6 oz)   03/24/22 67.1 kg (148 lb)                  Patient Active Problem List   Diagnosis     Ascending aorta dilation (H)     Pulmonary hypertension - mild (H)     Past Surgical History:   Procedure Laterality Date     CATARACT IOL, RT/LT Bilateral 02/2020     HC ECP WITH CATARACT SURGERY Right 03/15/2019    In [place]       Social History     Tobacco Use     Smoking status: Never     Smokeless tobacco: Never   Vaping Use     Vaping status: Never Used   Substance Use Topics     Alcohol use: Never     Family History   Problem Relation Age of Onset     No Known Problems Mother         intestinal blockage     No Known Problems Father          Current Outpatient Medications   Medication Sig Dispense Refill     calcium carb-cholecalciferol (OS-JUDY) 500-200 MG-UNIT tablet Take 1 tablet by mouth 2 times daily (with meals) 180 tablet 3     Multiple Vitamin (MULTIVITAMIN PO) Take 1 tablet by mouth daily       No Known Allergies  Recent Labs   Lab Test 02/28/22  0953 01/28/22  1123 09/24/20  0849   LDL 88  --  80   HDL 37*  --  51   TRIG 156*  --  163*   ALT 12  --  12   CR 0.58 0.50* 0.68   GFRESTIMATED >90 >90 >90   GFRESTBLACK  --   --  >90   POTASSIUM 3.9 4.0 4.0   TSH 1.71  --   --       Mammogram Screening: Mammogram Screening: Recommended mammography every 1-2 years with patient discussion and risk factor consideration         "6/1/2022     8:43 AM   Breast CA Risk Assessment (FHS-7)   Do you have a family history of breast, colon, or ovarian cancer? No / Unknown       Mammogram Screening: Recommended mammography every 1-2 years with patient discussion and risk factor consideration  Pertinent mammograms are reviewed under the imaging tab.    Review of Systems   Constitutional: Negative for chills and fever.   HENT: Negative for congestion, ear pain, hearing loss and sore throat.    Eyes: Negative for pain and visual disturbance.   Respiratory: Negative for cough and shortness of breath.    Cardiovascular: Negative for chest pain, palpitations and peripheral edema.   Gastrointestinal: Negative for abdominal pain, constipation, diarrhea, heartburn, hematochezia and nausea.   Breasts:  Negative for tenderness, breast mass and discharge.   Genitourinary: Negative for dysuria, frequency, genital sores, hematuria, pelvic pain, urgency, vaginal bleeding and vaginal discharge.   Musculoskeletal: Negative for arthralgias, joint swelling and myalgias.   Skin: Negative for rash.   Neurological: Negative for dizziness, weakness, headaches and paresthesias.   Psychiatric/Behavioral: Negative for mood changes. The patient is not nervous/anxious.        OBJECTIVE:   /70 (BP Location: Right arm, Patient Position: Sitting, Cuff Size: Adult Large)   Pulse 58   Temp 97.6  F (36.4  C) (Tympanic)   Resp 14   Ht 1.499 m (4' 11\")   Wt 63 kg (139 lb)   SpO2 99%   BMI 28.07 kg/m   Estimated body mass index is 28.07 kg/m  as calculated from the following:    Height as of this encounter: 1.499 m (4' 11\").    Weight as of this encounter: 63 kg (139 lb).  Physical Exam  GENERAL: healthy, alert and no distress  EYES: Eyes grossly normal to inspection, PERRL and conjunctivae and sclerae normal  HENT: ear canals and TM's normal, nose and mouth without ulcers or lesions  NECK: no adenopathy, no asymmetry, masses, or scars and thyroid normal to " palpation  RESP: lungs clear to auscultation - no rales, rhonchi or wheezes  CV: regular rate and rhythm, normal S1 S2, no S3 or S4, no murmur, click or rub, no peripheral edema and peripheral pulses strong  ABDOMEN: soft, nontender, no hepatosplenomegaly, no masses and bowel sounds normal  MS: no gross musculoskeletal defects noted, no edema  SKIN: no suspicious lesions or rashes  NEURO: Normal strength and tone, mentation intact and speech normal  PSYCH: mentation appears normal, affect normal/bright    Diagnostic Test Results:  Labs reviewed in Epic    ASSESSMENT / PLAN:   Routine general medical examination at a health care facility  Reviewed personal and family history. Reviewed age appropriate screenings. Reviewed healthy BP and BMI ranges. Counseled on lifestyle modifications for optimal mental and physical health.  Discussed age-appropriate health maintenance. Recommended any needed vaccinations. Continue to focus on well balanced diet and exercise.   - Lipid panel reflex to direct LDL Fasting; Future  - Basic metabolic panel  (Ca, Cl, CO2, Creat, Gluc, K, Na, BUN); Future    Screen for colon cancer  Discussed options, prefers cologuard.  - COLOGUARD([employer]); Future    Visit for screening mammogram  - *MA Screening Digital Bilateral; Future    Pulmonary hypertension (H)  Ascending aorta dilation (H)  Ascending aorta dilation, pulmonary HTN. Follows with cardiology. Had recent echo 5/8/23 and will be scheduling CTA chest. Appointment with cardiology scheduled for 9/2023.       COUNSELING:  Reviewed preventive health counseling, as reflected in patient instructions        She reports that she has never smoked. She has never used smokeless tobacco.      Appropriate preventive services were discussed with this patient, including applicable screening as appropriate for cardiovascular disease, diabetes, osteopenia/osteoporosis, and glaucoma.  As appropriate for age/gender, discussed screening for  colorectal cancer, prostate cancer, breast cancer, and cervical cancer. Checklist reviewing preventive services available has been given to the patient.    Reviewed patients plan of care and provided an AVS. The Basic Care Plan (routine screening as documented in Health Maintenance) for Ping meets the Care Plan requirement. This Care Plan has been established and reviewed with the Patient.          Samantha Thomas PA-C  [facility]    Identified Health Risks:    I have reviewed Opioid Use Disorder and Substance Use Disorder risk factors and made any needed referrals.       She is at risk for lack of exercise and has been provided with information to increase physical activity for the benefit of her well-being.

## 2023-05-16 NOTE — LETTER
May 18, 2023      Ping Guerrero  [id number] [address]  [address] [place] [address]        ??? [place]????     ?????????? ??????????????     ?????????????????? ??????????????????????????????????????? 10 ??????????? 4.7%? ?????? ??? ?????????????       ???????????????????????????     ????????????????????     ???   Samantha Thomas PA-C     ?? ??????? 1 ?? 1 ?? 2 ??   ??? <200 mg/dL 166  156  164    ???? <150 mg/dL 132  156????   163????  CM    ?????????? (HDL) >=50 mg/dL 41????  37????   51 R    ???????????? <=100 mg/dL 99  88  80 R, CM    ?????????? <130 mg/dL 125  119  113      ?? ??????? 1 ??  ?2023 ? 5 ? 16 ?? 1 ??  ?2022 ? 2 ? 28 ?? 1 ??  ?2022 ? 1 ? 28 ?? 2 ??  ?2020 ? 9 ? 24 ??   ? 136 - 145 mmol/L 140  141 R  137 R  143 R    ? 3.4 - 5.3 mmol/L 4.2       ? 98 - 107 mmol/L 102       ???? (CO2) 22 - 29 mmol/L 27       ????? 7 - 15 mmol/L 11  4 R  7 R  10 R    ??? 8.0 - 23.0 mg/dL 17.1       ?? 0.51 - 0.95 mg/dL 0.56  0.58 R  0.50???? R  0.68 R    ? 8.8 - 10.2 mg/dL 9.3  8.9 R  8.4???? R  8.7 R    ??? 70 - 99 mg/dL 94       ????????? >60 mL/min/1.73m2 >90  >90 CM  >90 CM  >90 R, CM

## 2023-05-17 LAB
ANION GAP SERPL CALCULATED.3IONS-SCNC: 11 MMOL/L (ref 7–15)
BUN SERPL-MCNC: 17.1 MG/DL (ref 8–23)
CALCIUM SERPL-MCNC: 9.3 MG/DL (ref 8.8–10.2)
CHLORIDE SERPL-SCNC: 102 MMOL/L (ref 98–107)
CHOLEST SERPL-MCNC: 166 MG/DL
CREAT SERPL-MCNC: 0.56 MG/DL (ref 0.51–0.95)
DEPRECATED HCO3 PLAS-SCNC: 27 MMOL/L (ref 22–29)
GFR SERPL CREATININE-BSD FRML MDRD: >90 ML/MIN/1.73M2
GLUCOSE SERPL-MCNC: 94 MG/DL (ref 70–99)
HDLC SERPL-MCNC: 41 MG/DL
LDLC SERPL CALC-MCNC: 99 MG/DL
NONHDLC SERPL-MCNC: 125 MG/DL
POTASSIUM SERPL-SCNC: 4.2 MMOL/L (ref 3.4–5.3)
SODIUM SERPL-SCNC: 140 MMOL/L (ref 136–145)
TRIGL SERPL-MCNC: 132 MG/DL

## 2023-06-19 ENCOUNTER — ANCILLARY PROCEDURE (OUTPATIENT)
Dept: MAMMOGRAPHY | Facility: CLINIC | Age: 68
End: 2023-06-19
Attending: PHYSICIAN ASSISTANT
Payer: COMMERCIAL

## 2023-06-19 DIAGNOSIS — Z12.31 VISIT FOR SCREENING MAMMOGRAM: ICD-10-CM

## 2023-06-19 PROCEDURE — 77067 SCR MAMMO BI INCL CAD: CPT | Mod: TC | Performed by: RADIOLOGY

## 2023-06-19 PROCEDURE — 77063 BREAST TOMOSYNTHESIS BI: CPT | Mod: TC | Performed by: RADIOLOGY

## 2023-09-05 ENCOUNTER — HOSPITAL ENCOUNTER (OUTPATIENT)
Dept: CT IMAGING | Facility: CLINIC | Age: 68
Discharge: HOME | End: 2023-09-05
Attending: INTERNAL MEDICINE | Admitting: INTERNAL MEDICINE
Payer: COMMERCIAL

## 2023-09-05 DIAGNOSIS — I77.810 ASCENDING AORTA DILATION (H): ICD-10-CM

## 2023-09-05 PROCEDURE — 250N000009 HC RX 250: Performed by: INTERNAL MEDICINE

## 2023-09-05 PROCEDURE — 71275 CT ANGIOGRAPHY CHEST: CPT

## 2023-09-05 PROCEDURE — 250N000011 HC RX IP 250 OP 636: Performed by: INTERNAL MEDICINE

## 2023-09-05 RX ORDER — IOPAMIDOL 755 MG/ML
500 INJECTION, SOLUTION INTRAVASCULAR ONCE
Status: COMPLETED | OUTPATIENT
Start: 2023-09-05 | End: 2023-09-05

## 2023-09-05 RX ADMIN — SODIUM CHLORIDE 60 ML: 9 INJECTION, SOLUTION INTRAVENOUS at 13:13

## 2023-09-05 RX ADMIN — IOPAMIDOL 72 ML: 755 INJECTION, SOLUTION INTRAVENOUS at 13:13

## 2023-09-19 ENCOUNTER — OFFICE VISIT (OUTPATIENT)
Dept: CARDIOLOGY | Facility: CLINIC | Age: 68
End: 2023-09-19
Payer: COMMERCIAL

## 2023-09-19 VITALS
OXYGEN SATURATION: 96 % | DIASTOLIC BLOOD PRESSURE: 72 MMHG | HEART RATE: 53 BPM | WEIGHT: 137 LBS | BODY MASS INDEX: 27.62 KG/M2 | SYSTOLIC BLOOD PRESSURE: 139 MMHG | HEIGHT: 59 IN

## 2023-09-19 DIAGNOSIS — I71.21 ANEURYSM OF ASCENDING AORTA WITHOUT RUPTURE (H): Primary | ICD-10-CM

## 2023-09-19 LAB — LVEF ECHO: NORMAL

## 2023-09-19 PROCEDURE — 99214 OFFICE O/P EST MOD 30 MIN: CPT | Performed by: INTERNAL MEDICINE

## 2023-09-19 NOTE — PROGRESS NOTES
SERVICE DATE: September 19, 2023    PRIMARY CARE PROVIDER:  Ashely Fregoso  [id number]  [id number]    REASON FOR VISIT:  Ascending aorta dilatation.    HISTORY OF PRESENT ILLNESS:  Ping Guerrero is a 67 year old female, accompanied by her daughter.  Clinic visit was via a [employer] . Ping is 66 years, moved to the [place] from [place] in [place], lives with her daughter, and is known to have mild ascending aorta thoracic aneurysm of 4.1 X 4 cm on CT angiogram with a body surface area of 1.6 cm .  She is not hypertensive, nontobacco user, BMI 27, not on any prescription medications.  No family history of aortopathy.    Patient lives with her daughter, walks about 10 to 15 minutes daily, has no cardiovascular symptoms.  BP is 129/72, pulse 53 bpm.      Lab results reviewed -hemoglobin 11.7, normal TSH, normal renal panel with a creatinine of 0.56, LDL 99.    Transthoracic echocardiogram dated 5/8/2023 was independently interpreted by me.  Normal LVEF of 65%, trileaflet aortic valve with trace regurgitation, normal right ventricular systolic function.  Aortic root dimension normal at 3.5 cm, ascending aorta thoracic aneurysm of 4.0-4.1 cm, normal inferior vena cava.    CT angiogram of the chest dated 9/5/2023 shows stable ascending thoracic aortic aneurysm of 4.2 cm.    On exam - regular heart sounds, no murmur, no carotid bruit.  Lungs are clear to auscultation.  No lower extremity edema.    DIAGNOSES/ASSESSMENT:  1.  Stable, ascending aortic thoracic aneurysm of 4.0-4.1 cm on echocardiogram, 4.2 cm on CT angiogram.  Continue yearly imaging surveillance.  She is normotensive.    PLAN:  1.  Test results and follow-up plan reviewed with patient and her daughter with the help of the [employer] .  2.  Follow-up with me in 2 years with transthoracic echocardiogram.      Avani Lomeli MD      Established patient.   Today's clinic visit entailed:  Review of  "the result(s) of each unique test - CBC, renal panel, thyroid panel, CT angiogram of chest.  The following tests were independently interpreted by me as noted in my documentation: Transthoracic echocardiogram.  Ordering of each unique test  30 minutes spent by me on the date of the encounter doing chart review, history and exam, documentation and further activities per the note.        PHYSICAL EXAMINATION:  Vitals: /72   Pulse 53   Ht 1.499 m (4' 11\")   Wt 62.1 kg (137 lb)   SpO2 96%   BMI 27.67 kg/m    Wt Readings from Last 5 Encounters:   09/19/23 62.1 kg (137 lb)   05/16/23 63 kg (139 lb)   06/01/22 65.8 kg (145 lb 1.6 oz)   03/24/22 67.1 kg (148 lb)   02/28/22 64 kg (141 lb)     CARDIOVASCULAR:  Regular heart sounds.  No murmur. No carotid bruit.   RESPIRATORY:  Normal breath sounds. No rales or wheeze.  EXTREMITIES:  No edema.    Encounter Diagnosis   Name Primary?    Aneurysm of ascending aorta without rupture (H) Yes         Orders Placed This Encounter   Procedures    Follow-Up with Cardiologist           CURRENT MEDICATIONS:  Current Outpatient Medications   Medication Sig Dispense Refill    calcium carb-cholecalciferol (OS-JUDY) 500-200 MG-UNIT tablet Take 1 tablet by mouth 2 times daily (with meals) 180 tablet 3    Multiple Vitamin (MULTIVITAMIN PO) Take 1 tablet by mouth daily           ALLERGIES:  No Known Allergies    PAST MEDICAL HISTORY:    Past Medical History:   Diagnosis Date    Nonsenile cataract 03/2019    Right eye only       PAST SURGICAL HISTORY:    Past Surgical History:   Procedure Laterality Date    CATARACT IOL, RT/LT Bilateral 02/2020    HC ECP WITH CATARACT SURGERY Right 03/15/2019    In [place]       FAMILY HISTORY:    Family History   Problem Relation Age of Onset    No Known Problems Mother         intestinal blockage    No Known Problems Father        SOCIAL HISTORY:    Social History     Socioeconomic History    Marital status: Single     Spouse name: None    Number of " children: None    Years of education: None    Highest education level: None   Tobacco Use    Smoking status: Never    Smokeless tobacco: Never   Vaping Use    Vaping Use: Never used   Substance and Sexual Activity    Alcohol use: Never    Drug use: Never    Sexual activity: Not Currently     Social Determinants of Health     Financial Resource Strain: Low Risk  (10/13/2020)    Overall Financial Resource Strain (CARDIA)     Difficulty of Paying Living Expenses: Not hard at all   Food Insecurity: No Food Insecurity (10/13/2020)    Hunger Vital Sign     Worried About Running Out of Food in the Last Year: Never true     Ran Out of Food in the Last Year: Never true   Transportation Needs: Unknown (10/13/2020)    PRAPARE - Transportation     Lack of Transportation (Medical): No   Physical Activity: Inactive (10/13/2020)    Exercise Vital Sign     Days of Exercise per Week: 0 days     Minutes of Exercise per Session: 0 min

## 2023-09-19 NOTE — LETTER
9/19/2023    Ashely Fregoso MD  [address] [address] [address]    RE: Ping Guerrero       Dear Colleague,     I had the [facility] of seeing [facility]ing [facility]erre[facility] in the [facility].    SERVICE DATE: September 19, 2023    PRIMARY CARE PROVIDER:  Ashely Fregoso  [address] [address] [address]    REASON FOR VISIT:  Ascending aorta dilatation.    HISTORY OF PRESENT ILLNESS:  Ping Guerrero is a 67 year old female, accompanied by her daughter.  Clinic visit was via a [facility] . Ping is 66 years, moved [place] the [place] from [place] in [place], lives with her daughter, and is known to have mild ascending aorta thoracic aneurysm of 4.1 X 4 cm on CT angiogram with a body surface area of 1.6 cm .  She is not hypertensive, nontobacco user, BMI 27, not on any prescription medications.  No family history of aortopathy.    Patient lives with her daughter, walks about 10 to 15 minutes daily, has no cardiovascular symptoms.  BP is 129/72, pulse 53 bpm.      Lab results reviewed -hemoglobin 11.7, normal TSH, normal renal panel with a creatinine of 0.56, LDL 99.    Transthoracic echocardiogram dated 5/8/2023 was independently interpreted by me.  Normal LVEF of 65%, trileaflet aortic valve with trace regurgitation, normal right ventricular systolic function.  Aortic root dimension normal at 3.5 cm, ascending aorta thoracic aneurysm of 4.0-4.1 cm, normal inferior vena cava.    CT angiogram of the chest dated 9/5/2023 shows stable ascending thoracic aortic aneurysm of 4.2 cm.    On exam - regular heart sounds, no murmur, no carotid bruit.  Lungs are clear to auscultation.  No lower extremity edema.    DIAGNOSES/ASSESSMENT:  1.  Stable, ascending aortic thoracic aneurysm of 4.0-4.1 cm on echocardiogram, 4.2 cm on CT angiogram.  Continue yearly imaging surveillance.  She is normotensive.    PLAN:  1.  Test results and follow-up plan reviewed with patient and her daughter  "with the help of the rah .  2.  Follow-up with me in 2 years with transthoracic echocardiogram.      Avani Lomeli MD      Established patient.   Today's clinic visit entailed:  Review of the result(s) of each unique test - CBC, renal panel, thyroid panel, CT angiogram of chest.  The following tests were independently interpreted by me as noted in my documentation: Transthoracic echocardiogram.  Ordering of each unique test  30 minutes spent by me on the date of the encounter doing chart review, history and exam, documentation and further activities per the note.        PHYSICAL EXAMINATION:  Vitals: /72   Pulse 53   Ht 1.499 m (4' 11\")   Wt 62.1 kg (137 lb)   SpO2 96%   BMI 27.67 kg/m    Wt Readings from Last 5 Encounters:   09/19/23 62.1 kg (137 lb)   05/16/23 63 kg (139 lb)   06/01/22 65.8 kg (145 lb 1.6 oz)   03/24/22 67.1 kg (148 lb)   02/28/22 64 kg (141 lb)     CARDIOVASCULAR:  Regular heart sounds.  No murmur. No carotid bruit.   RESPIRATORY:  Normal breath sounds. No rales or wheeze.  EXTREMITIES:  No edema.    Encounter Diagnosis   Name Primary?    Aneurysm of ascending aorta without rupture (H) Yes         Orders Placed This Encounter   Procedures    Follow-Up with Cardiologist           CURRENT MEDICATIONS:  Current Outpatient Medications   Medication Sig Dispense Refill    calcium carb-cholecalciferol (OS-JUDY) 500-200 MG-UNIT tablet Take 1 tablet by mouth 2 times daily (with meals) 180 tablet 3    Multiple Vitamin (MULTIVITAMIN PO) Take 1 tablet by mouth daily           ALLERGIES:  No Known Allergies    PAST MEDICAL HISTORY:    Past Medical History:   Diagnosis Date    Nonsenile cataract 03/2019    Right eye only       PAST SURGICAL HISTORY:    Past Surgical History:   Procedure Laterality Date    CATARACT IOL, RT/LT Bilateral 02/2020    HC ECP WITH CATARACT SURGERY Right 03/15/2019    In [place]       FAMILY HISTORY:    Family History   Problem Relation Age of Onset    No " Known Problems Mother         intestinal blockage    No Known Problems Father        SOCIAL HISTORY:    Social History     Socioeconomic History    Marital status: Single     Spouse name: None    Number of children: None    Years of education: None    Highest education level: None   Tobacco Use    Smoking status: Never    Smokeless tobacco: Never   Vaping Use    Vaping Use: Never used   Substance and Sexual Activity    Alcohol use: Never    Drug use: Never    Sexual activity: Not Currently     Social Determinants of Health     Financial Resource Strain: Low Risk  (10/13/2020)    Overall Financial Resource Strain (CARDIA)     Difficulty of Paying Living Expenses: Not hard at all   Food Insecurity: No Food Insecurity (10/13/2020)    Hunger Vital Sign     Worried About Running Out of Food in the Last Year: Never true     Ran Out of Food in the Last Year: Never true   Transportation Needs: Unknown (10/13/2020)    PRAPARE - Transportation     Lack of Transportation (Medical): No   Physical Activity: Inactive (10/13/2020)    Exercise Vital Sign     Days of Exercise per Week: 0 days     Minutes of Exercise per Session: 0 min       Thank you for allowing me to participate in the care of your patient.      Sincerely,     Avani Lomeli MD     [facility]  cc:   No referring provider defined for this encounter.

## 2024-05-28 ENCOUNTER — OFFICE VISIT (OUTPATIENT)
Dept: FAMILY MEDICINE | Facility: CLINIC | Age: 69
End: 2024-05-28
Payer: COMMERCIAL

## 2024-05-28 VITALS
TEMPERATURE: 97 F | RESPIRATION RATE: 16 BRPM | WEIGHT: 134 LBS | HEIGHT: 59 IN | SYSTOLIC BLOOD PRESSURE: 136 MMHG | HEART RATE: 49 BPM | DIASTOLIC BLOOD PRESSURE: 80 MMHG | OXYGEN SATURATION: 98 % | BODY MASS INDEX: 27.01 KG/M2

## 2024-05-28 DIAGNOSIS — Z12.4 CERVICAL CANCER SCREENING: ICD-10-CM

## 2024-05-28 DIAGNOSIS — I71.21 ANEURYSM OF ASCENDING AORTA WITHOUT RUPTURE (H): ICD-10-CM

## 2024-05-28 DIAGNOSIS — Z13.220 LIPID SCREENING: ICD-10-CM

## 2024-05-28 DIAGNOSIS — R03.0 ELEVATED BP WITHOUT DIAGNOSIS OF HYPERTENSION: ICD-10-CM

## 2024-05-28 DIAGNOSIS — Z78.0 ASYMPTOMATIC POSTMENOPAUSAL STATUS: ICD-10-CM

## 2024-05-28 DIAGNOSIS — Z12.11 SCREEN FOR COLON CANCER: ICD-10-CM

## 2024-05-28 DIAGNOSIS — Z00.00 ROUTINE GENERAL MEDICAL EXAMINATION AT A HEALTH CARE FACILITY: Primary | ICD-10-CM

## 2024-05-28 DIAGNOSIS — Z12.31 VISIT FOR SCREENING MAMMOGRAM: ICD-10-CM

## 2024-05-28 PROCEDURE — 80048 BASIC METABOLIC PNL TOTAL CA: CPT | Performed by: PHYSICIAN ASSISTANT

## 2024-05-28 PROCEDURE — 99397 PER PM REEVAL EST PAT 65+ YR: CPT | Performed by: PHYSICIAN ASSISTANT

## 2024-05-28 PROCEDURE — 36415 COLL VENOUS BLD VENIPUNCTURE: CPT | Performed by: PHYSICIAN ASSISTANT

## 2024-05-28 PROCEDURE — 87624 HPV HI-RISK TYP POOLED RSLT: CPT | Performed by: PHYSICIAN ASSISTANT

## 2024-05-28 PROCEDURE — 80061 LIPID PANEL: CPT | Performed by: PHYSICIAN ASSISTANT

## 2024-05-28 PROCEDURE — 99213 OFFICE O/P EST LOW 20 MIN: CPT | Mod: 25 | Performed by: PHYSICIAN ASSISTANT

## 2024-05-28 PROCEDURE — G0145 SCR C/V CYTO,THINLAYER,RESCR: HCPCS | Performed by: PHYSICIAN ASSISTANT

## 2024-05-28 RX ORDER — CALCIUM CARBONATE/VITAMIN D3 500MG-5MCG
1 TABLET ORAL 2 TIMES DAILY WITH MEALS
Qty: 180 TABLET | Refills: 3 | Status: SHIPPED | OUTPATIENT
Start: 2024-05-28

## 2024-05-28 SDOH — HEALTH STABILITY: PHYSICAL HEALTH: ON AVERAGE, HOW MANY DAYS PER WEEK DO YOU ENGAGE IN MODERATE TO STRENUOUS EXERCISE (LIKE A BRISK WALK)?: 1 DAY

## 2024-05-28 ASSESSMENT — SOCIAL DETERMINANTS OF HEALTH (SDOH): HOW OFTEN DO YOU GET TOGETHER WITH FRIENDS OR RELATIVES?: TWICE A WEEK

## 2024-05-28 ASSESSMENT — PAIN SCALES - GENERAL: PAINLEVEL: NO PAIN (0)

## 2024-05-28 NOTE — PATIENT INSTRUCTIONS
"Preventive Care Advice   This is general advice we often give to help people stay healthy. Your care team may have specific advice just for you. Please talk to your care team about your own preventive care needs.  Lifestyle  Exercise at least 150 minutes each week (30 minutes a day, 5 days a week).  Do muscle strengthening activities 2 days a week. These help control your weight and prevent disease.  No smoking.  Wear sunscreen to prevent skin cancer.  Have your home tested for radon every 2 to 5 years. Radon is a colorless, odorless gas that can harm your lungs. To learn more, go to www.health.[facility].mn. and search for \"Radon in Homes.\"  Keep guns unloaded and locked up in a safe place like a safe or gun vault, or, use a gun lock and hide the keys. Always lock away bullets separately. To learn more, visit [place].gov and search for \"safe gun storage.\"  Nutrition  Eat 5 or more servings of fruits and vegetables each day.  Try wheat bread, brown rice and whole grain pasta (instead of white bread, rice, and pasta).  Get enough calcium and vitamin D. Check the label on foods and aim for 100% of the RDA (recommended daily allowance).  Regular exams  Have a dental exam and cleaning every 6 months.  See your health care team every year to talk about:  Any changes in your health.  Any medicines your care team has prescribed.  Preventive care, family planning, and ways to prevent chronic diseases.  Shots (vaccines)   HPV shots (up to age 26), if you've never had them before.  Hepatitis B shots (up to age 59), if you've never had them before.  COVID-19 shot: Get this shot when it's due.  Flu shot: Get a flu shot every year.  Tetanus shot: Get a tetanus shot every 10 years.  Pneumococcal, hepatitis A, and RSV shots: Ask your care team if you need these based on your risk.  Shingles shot (for age 50 and up).  General health tests  Diabetes screening:  Starting at age 35, Get screened for diabetes at least every 3 years.  If " you are younger than age 35, ask your care team if you should be screened for diabetes.  Cholesterol test: At age 39, start having a cholesterol test every 5 years, or more often if advised.  Bone density scan (DEXA): At age 50, ask your care team if you should have this scan for osteoporosis (brittle bones).  Hepatitis C: Get tested at least once in your life.  Abdominal aortic aneurysm screening: Talk to your doctor about having this screening if you:  Have ever smoked; and  Are biologically male; and  Are between the ages of 65 and 75.  STIs (sexually transmitted infections)  Before age 24: Ask your care team if you should be screened for STIs.  After age 24: Get screened for STIs if you're at risk. You are at risk for STIs (including HIV) if:  You are sexually active with more than one person.  You don't use condoms every time.  You or a partner was diagnosed with a sexually transmitted infection.  If you are at risk for HIV, ask about PrEP medicine to prevent HIV.  Get tested for HIV at least once in your life, whether you are at risk for HIV or not.  Cancer screening tests  Cervical cancer screening: If you have a cervix, begin getting regular cervical cancer screening tests at age 21. Most people who have regular screenings with normal results can stop after age 65. Talk about this with your provider.  Breast cancer scan (mammogram): If you've ever had breasts, begin having regular mammograms starting at age 40. This is a scan to check for breast cancer.  Colon cancer screening: It is important to start screening for colon cancer at age 45.  Have a colonoscopy test every 10 years (or more often if you're at risk) Or, ask your provider about stool tests like a FIT test every year or Cologuard test every 3 years.  To learn more about your testing options, visit: www.[employer]025104.pdf.  For help making a decision, visit: [email or web address].  Prostate cancer screening test: If you have a prostate and are age 55  to 69, ask your provider if you would benefit from a yearly prostate cancer screening test.  Lung cancer screening: If you are a current or former smoker age 50 to 80, ask your care team if ongoing lung cancer screenings are right for you.  For informational purposes only. Not to replace the advice of your health care provider. Copyright   2023 [employer]. All rights reserved. Clinically reviewed by the [facility] Transitions Program. [facility] [id number] - REV 04/24.

## 2024-05-28 NOTE — COMMUNITY RESOURCES LIST (ENGLISH)
May 28, 2024           YOUR PERSONALIZED LIST OF SERVICES & PROGRAMS           & SHELTER    Housing      [address] [place] [address] - Violence Prevention Services - Domestic Violence Shelter  [place] [address] (Distance: 6.2 miles)  Phone: [phone]  Website: https://[address] [place], [place] [address]ParkWhiz.org/  Language: English      [facility] ([facility]) [facility] - Shelter for individuals  [address] [place] [address] (Distance: 3.0 miles)  Language: English, Syriac  Fee: Free      [facility] - [facility]  Phone: [phone]  Website: https://www.[facility].PROTEGO/  Language: English, Hmong, Oromo, Belarusian, Syriac  Hours: Mon 9:00 AM - 5:00 PM Tue 9:00 AM - 5:00 PM Wed 9:00 AM - 5:00 PM Thu 9:00 AM - 5:00 PM Fri 9:00 AM - 5:00 PM  Fee: Insurance  Accessibility: Blind accommodation, Deaf or hard of hearing, Translation services  Transportation Options: Free transportation    Case Management      [facility] - Housing search assistance  [address] [place] [address] (Distance: 11.5 miles)  Phone: [phone]  Language: English  Fee: Free, Sliding scale, Insurance  Accessibility: Translation services, Ada accessible, Blind accommodation, Deaf or hard of hearing      [facility] - Housing search assistance  [address] [place] [address] 13 [facility] [address] [place] [address] (Distance: 5.8 miles)  Phone: [phone]  Language: English  Fee: Sliding scale, Self pay      [facility] - Housing Stabilization Services  Phone: [phone]  Website: https://[employer].PROTEGO/  Language: English  Hours: Mon 8:00 AM - 4:00 PM Tue 8:00 AM - 4:00 PM Wed 8:00 AM - 4:00 PM Thu 8:00 AM - 4:00 PM Fri 8:00 AM - 4:00 PM  Fee: Free  Accessibility: Blind accommodation, Deaf or hard of hearing  Transportation Options: Free transportation    Drop-In Services      [facility] - [facility] [employer]  [address] [place] [address] 5 [place] [address] (Distance: 17.0 miles)  Phone: [phone]   Language: English  Fee: Free      [facility] - Elder Independent Living Program  [address] [place] [address] (Distance: 9.4 miles)  Website: https://www.[facility].info/elders  Language: English      STATES POSTAL SERVICE - MAIL SERVICE FOR THE HOMELESS  Phone: (380) 428-8076  Website: https://www.Citizinvestor               IMPORTANT NUMBERS & WEBSITES        Emergency Services  911  .   United Way  211 http://211unitedway.org  .   Poison Control  (624) 566-3610 http://mnpoison.org http://wisconsinpoison.org  .     Suicide and Crisis Lifeline  988 http://988DB Networksline.[place]  .   Childhelp [place] Child Abuse Hotline  916.789.9317 http://Childhelphotline.org   .   [place] Sexual Assault Hotline  (138) 767-8979 (HOPE) http://PowerOasis.[place]   .     [place] Runaway Safeline  (911) 814-5543 (RUNAWAY) http://Mediaspectrum.[place]  .   Pregnancy & Postpartum Support  Call/text 825-353-7356  [place]: http://ppsupportmn.org  [place]: http://Ligon Discovery.com/wi  .   Substance Abuse National Helpline ([facility])  189-634-HELP (6114) http://Findtreatment.gov   .                DISCLAIMER: These resources have been generated via the Prom[employer]an Platform. [employer]ethean does not endorse any service providers mentioned in this resource list. [employer] does not guarantee that the services mentioned in this resource list will be available to you or will improve your health or wellness.    [facility]

## 2024-05-28 NOTE — PROGRESS NOTES
"Preventive Care Visit  [facility] SUNIL Thomas PA-C, Family Medicine  May 28, 2024      Assessment & Plan     Routine general medical examination at a health care facility  - Basic metabolic panel  (Ca, Cl, CO2, Creat, Gluc, K, Na, BUN); Future  - Basic metabolic panel  (Ca, Cl, CO2, Creat, Gluc, K, Na, BUN)    Screen for colon cancer  - Colonoscopy Screening  Referral; Future    Cervical cancer screening  - Pap Screen with HPV - Recommended Age 30 - 65 Years  - Gynecologic Cytology (PAP)    Aneurysm of ascending aorta without rupture (H24)  Chronic, has been stable. Following with cardiology    Asymptomatic postmenopausal status  Encourage calcium/vitamin D for bone health.  Normal DEXA 3/2022  - Calcium Carb-Cholecalciferol (CALCIUM + VITAMIN D3) 500-5 MG-MCG TABS; Take 1 tablet by mouth 2 times daily (with meals)    Visit for screening mammogram  - MA Screen Bilateral w/Anthony; Future    Lipid screening  She is fasting today  - Lipid panel reflex to direct LDL Fasting; Future  - Lipid panel reflex to direct LDL Fasting    Elevated BP without diagnosis of hypertension  BP elevated initially, lower on recheck. No h/o HTN. No chest pain, shortness of breath, swelling in the extremities, palpitations, dizziness, headaches, blurred vision. Follow-up for nurse BP check in 1 month.      BMI  Estimated body mass index is 27.06 kg/m  as calculated from the following:    Height as of this encounter: 1.499 m (4' 11\").    Weight as of this encounter: 60.8 kg (134 lb).   Weight management plan: Discussed healthy diet and exercise guidelines    Counseling  Appropriate preventive services were discussed with this patient.  Checklist reviewing preventive services available has been given to the patient.        Jeannie Feng is a 68 year old, presenting for the following:  Medicare Visit        5/28/2024    10:39 AM   Additional Questions   Roomed by marquita niño   Accompanied by Daughter " "        5/28/2024    10:39 AM   Patient Reported Additional Medications   Patient reports taking the following new medications na        Health Care Directive  Patient does not have a Health Care Directive or Living Will: Discussed advance care planning with patient; however, patient declined at this time.    HPI    Ascending aorta dilation, pulmonary HTN. Follows with cardiology. Last saw cardiology 9/2023.    \"CT angiogram of the chest dated 9/5/2023 shows stable ascending thoracic aortic aneurysm of 4.2 cm.  DIAGNOSES/ASSESSMENT:  1.  Stable, ascending aortic thoracic aneurysm of 4.0-4.1 cm on echocardiogram, 4.2 cm on CT angiogram.  Continue yearly imaging surveillance.  She is normotensive.     PLAN:  1.  Test results and follow-up plan reviewed with patient and her daughter with the help of the rah .  2.  Follow-up with me in 2 years with transthoracic echocardiogram.\"          5/28/2024   General Health   How would you rate your overall physical health? Good   Feel stress (tense, anxious, or unable to sleep) Not at all         5/28/2024   Nutrition   Diet: Regular (no restrictions)         5/28/2024   Exercise   Days per week of moderate/strenous exercise 1 day   (!) EXERCISE CONCERN      5/28/2024   Social Factors   Frequency of gathering with friends or relatives Twice a week   Worry food won't last until get money to buy more No   Food not last or not have enough money for food? No   Do you have housing?  No   Are you worried about losing your housing? No   Lack of transportation? No   Unable to get utilities (heat,electricity)? No   Want help with housing or utility concern? No   (!) HOUSING CONCERN PRESENT      5/28/2024   Fall Risk   Fallen 2 or more times in the past year? No    No   Trouble with walking or balance? No    No          5/28/2024   Activities of Daily Living- Home Safety   Needs help with the following daily activites None of the above   Safety concerns in the home None of " the above         5/28/2024   Dental   Dentist two times every year? (!) NO         5/28/2024   Hearing Screening   Hearing concerns? None of the above         5/28/2024   Driving Risk Screening   Patient/family members have concerns about driving No         5/28/2024   General Alertness/Fatigue Screening   Have you been more tired than usual lately? No         5/28/2024   Urinary Incontinence Screening   Bothered by leaking urine in past 6 months No         5/28/2024   TB Screening   Were you born outside of the [place]? Yes         Today's PHQ-2 Score:       5/28/2024    10:35 AM   PHQ-2 ( 1999 Pfizer)   Q1: Little interest or pleasure in doing things 0   Q2: Feeling down, depressed or hopeless 0   PHQ-2 Score 0   Q1: Little interest or pleasure in doing things Not at all   Q2: Feeling down, depressed or hopeless Not at all   PHQ-2 Score 0           5/28/2024   Substance Use   Alcohol more than 3/day or more than 7/wk No   Do you have a current opioid prescription? No   How severe/bad is pain from 1 to 10? 0/10 (No Pain)   Do you use any other substances recreationally? No     Social History     Tobacco Use    Smoking status: Never    Smokeless tobacco: Never   Vaping Use    Vaping status: Never Used   Substance Use Topics    Alcohol use: Never    Drug use: Never           6/19/2023   LAST FHS-7 RESULTS   1st degree relative breast or ovarian cancer No   Any relative bilateral breast cancer No   Any male have breast cancer No   Any ONE woman have BOTH breast AND ovarian cancer No   Any woman with breast cancer before 50yrs No   2 or more relatives with breast AND/OR ovarian cancer No   2 or more relatives with breast AND/OR bowel cancer No        Mammogram Screening - Mammogram every 1-2 years updated in Health Maintenance based on mutual decision making      History of abnormal Pap smear: No - age 65 or older with pap indicated due to inadequate prior screening (3 consecutive negative cytology results, 2 consecutive  "negative cotesting results, or 2 consecutive negative HrHPV test results within 10 years, with the most recent test occurring within the recommended screening interval for the test used)        Latest Ref Rng & Units 5/28/2024    11:26 AM 9/24/2020     8:40 AM 9/24/2020     8:38 AM   PAP / HPV   PAP (Historical)    NIL    HPV 16 DNA Negative Negative  Negative     HPV 18 DNA Negative Negative  Negative     Other HR HPV Negative Negative  Negative       ASCVD Risk   The 10-year ASCVD risk score (Ray BRENNER, et al., 2019) is: 7.9%    Values used to calculate the score:      Age: 68 years      Sex: Female      Is Non- : No      Diabetic: No      Tobacco smoker: No      Systolic Blood Pressure: 136 mmHg      Is BP treated: No      HDL Cholesterol: 46 mg/dL      Total Cholesterol: 184 mg/dL          Reviewed and updated as needed this visit by Provider   Tobacco  Allergies  Meds  Problems  Med Hx  Surg Hx  Fam Hx            Past Medical History:   Diagnosis Date    Nonsenile cataract 03/2019    Right eye only    Pulmonary hypertension - mild (H) - she does not have pulmonary HTN per cardiology 02/28/2022    Saw cardiology 2/2022. Per notes: \"I do not think this lady has pulmonary hypertension or warrants additional testing.  Her RVSP is only mildly elevated, systemic blood pressure is normal, she does not have any other diseases, and right ventricular systolic function is normal.\"       Past Surgical History:   Procedure Laterality Date    CATARACT IOL, RT/LT Bilateral 02/2020    HC ECP WITH CATARACT SURGERY Right 03/15/2019    In [place]     Lab work is in process  Labs reviewed in EPIC  BP Readings from Last 3 Encounters:   05/28/24 136/80   09/19/23 139/72   05/16/23 110/70    Wt Readings from Last 3 Encounters:   05/28/24 60.8 kg (134 lb)   09/19/23 62.1 kg (137 lb)   05/16/23 63 kg (139 lb)                  Patient Active Problem List   Diagnosis    Aneurysm of ascending aorta " without rupture (H24)     Past Surgical History:   Procedure Laterality Date    CATARACT IOL, RT/LT Bilateral 02/2020    HC ECP WITH CATARACT SURGERY Right 03/15/2019    In [place]       Social History     Tobacco Use    Smoking status: Never    Smokeless tobacco: Never   Substance Use Topics    Alcohol use: Never     Family History   Problem Relation Age of Onset    No Known Problems Mother         intestinal blockage    No Known Problems Father          Current Outpatient Medications   Medication Sig Dispense Refill    Calcium Carb-Cholecalciferol (CALCIUM + VITAMIN D3) 500-5 MG-MCG TABS Take 1 tablet by mouth 2 times daily (with meals) 180 tablet 3    Multiple Vitamin (MULTIVITAMIN PO) Take 1 tablet by mouth daily       No Known Allergies  Recent Labs   Lab Test 05/28/24  1149 05/16/23  1425 02/28/22  0953 01/28/22  1123 09/24/20  0849   * 99 88  --  80   HDL 46* 41* 37*  --  51   TRIG 84 132 156*  --  163*   ALT  --   --  12  --  12   CR 0.50* 0.56 0.58   < > 0.68   GFRESTIMATED >90 >90 >90   < > >90   GFRESTBLACK  --   --   --   --  >90   POTASSIUM 3.9 4.2 3.9   < > 4.0   TSH  --   --  1.71  --   --     < > = values in this interval not displayed.      Current providers sharing in care for this patient include:  Patient Care Team:  Ashely Fregoso MD as PCP - General (Family Practice)  Avani Lomeli MD as Assigned Heart and Vascular Provider  Samantha Thomas PA-C as Assigned PCP    The following health maintenance items are reviewed in Epic and correct as of today:  Health Maintenance   Topic Date Due    COLORECTAL CANCER SCREENING  Never done    RSV VACCINE (Pregnancy & 60+) (1 - 1-dose 60+ series) Never done    PAP FOLLOW-UP  09/24/2023    HPV FOLLOW-UP  09/24/2023    COVID-19 Vaccine (7 - 2023-24 season) 03/11/2024    INFLUENZA VACCINE (Season Ended) 09/01/2024    MEDICARE ANNUAL WELLNESS VISIT  05/28/2025    ANNUAL REVIEW OF HM ORDERS  05/28/2025    FALL RISK ASSESSMENT   "05/28/2025    MAMMO SCREENING  06/19/2025    GLUCOSE  05/28/2027    LIPID  05/28/2029    ADVANCE CARE PLANNING  05/28/2029    DTAP/TDAP/TD IMMUNIZATION (2 - Td or Tdap) 09/24/2030    DEXA  03/15/2032    HEPATITIS C SCREENING  Completed    PHQ-2 (once per calendar year)  Completed    Pneumococcal Vaccine: 65+ Years  Completed    ZOSTER IMMUNIZATION  Completed    IPV IMMUNIZATION  Aged Out    HPV IMMUNIZATION  Aged Out    MENINGITIS IMMUNIZATION  Aged Out    RSV MONOCLONAL ANTIBODY  Aged Out    PAP  Discontinued         Review of Systems  Constitutional, HEENT, cardiovascular, pulmonary, gi and gu systems are negative, except as otherwise noted.     Objective    Exam  /80   Pulse (!) 49   Temp 97  F (36.1  C) (Oral)   Resp 16   Ht 1.499 m (4' 11\")   Wt 60.8 kg (134 lb)   SpO2 98%   BMI 27.06 kg/m     Estimated body mass index is 27.06 kg/m  as calculated from the following:    Height as of this encounter: 1.499 m (4' 11\").    Weight as of this encounter: 60.8 kg (134 lb).    Physical Exam  GENERAL: alert and no distress  EYES: Eyes grossly normal to inspection, PERRL and conjunctivae and sclerae normal  HENT: ear canals and TM's normal, nose and mouth without ulcers or lesions  NECK: no adenopathy, no asymmetry, masses, or scars  RESP: lungs clear to auscultation - no rales, rhonchi or wheezes  CV: bradycardia, regular rhythm, normal S1 S2, no S3 or S4, no murmur, click or rub, no peripheral edema  ABDOMEN: soft, nontender, no hepatosplenomegaly, no masses and bowel sounds normal  MS: no gross musculoskeletal defects noted, no edema  NEURO: Normal strength and tone, mentation intact and speech normal  : normal external genitalia, vaginal mucosal atrophy, normal cervix without masses or discharge  PSYCH: mentation appears normal, affect normal/bright         5/28/2024   Mini Cog   Clock Draw Score 2 Normal   3 Item Recall 3 objects recalled   Mini Cog Total Score 5             Signed Electronically by: " Samantha Thomas PA-C

## 2024-05-28 NOTE — COMMUNITY RESOURCES LIST (PATIENT PREFERRED LANGUAGE)
May 28, 2024           ????????????           ??????    ????      [facility] - Violence Prevention Services [place] [address] (??: 6.2 ??)  ??: [phone]  ??: https://360commun[facility].org/  ??: ??      [facility] ([facility] - Shelter for individuals  [address] [place] [address] (??: 3.0 ??)  ??: ??????  ??: ???      [facility] [employer]  ??: [phone]  ??: https://www.Smart Eye/  ??: ??, ????, ??, ????  ??: ?? ?? 9:00 - ?? 5:00 ?? ?? 9:00 - ?? 5:00 ?? ?? 9:00 - ?? 5:00 ?? ?? 9:00 - ?? 5:00 ??? ?? 9:00 - ?? 5:00  ??: ??  ?????: ??????????????????  ????: ????    ??????      County - Housing search assistance  [address] [facility] [address] (??: 11.5 ??)  ??: [phone]  ??: ??  ??: ??????????  ?????: ????????????????????????      [facility] - Housing search assistance  [address] [place] [address] (??: 5.8 ??)  ??: [phone]  ??: ??  ??: ?????????      [facility] - Housing Stabilization Services  ??: [phone]  ??: [email or web address]  ??: ??  ??: ?? ?? 8:00 - ?? 4:00 ?? ?? 8:00 - ?? 4:00 ?? ?? 8:00 - ?? 4:00 ?? ?? 8:00 - ?? 4:00 ??? ?? 8:00 - ?? 4:00  ??: ???  ?????: ?????????????  ????: ????    ?????????      [facility]ated - [facility]  [address] [place], [place] [address] [place] [address] (??: 17.0 ??)  ??: [phone]  ??: ??  ??: ???      [facility] [place] - Elder Independent Living Program  [address] [place] [address] (??: 9.4 ??)  ??: https://www.[facility].info/elders  ??: ??      [facility]AL SERVICE - MAIL SERVICE FOR THE HOMELESS  ??: [phone]  ??: https://www.usps.com               ???????        ????  911  .   ????  587 http://211unJounce Therapeutics.org  .   ????  (857) 534-7448 http://mnpoison.org http://wisconsinpoison.org  .     ????????  986 http://RewardsForceline.org  .    Childhelp ????????  475.448.8412 http://Childhelphotline.org   .   ???????  ?800?126-9078????http://Rainn.org   .     ????????  (312) 442-9456 (??) http://Ohm Universe.org  .   ???????  ??/???? 650.128.7036 [place]?http://CogniFit.org [place]?http://psichapters.com/wi  .   ?????????? ([facility])  003-059-AAVV (2303) http://Findtreatment.gov   .                ???????????? Unite Us ??????Unite Us ????????????????????Unite Us ????????????????????????????????    [facility]

## 2024-05-29 LAB
ANION GAP SERPL CALCULATED.3IONS-SCNC: 10 MMOL/L (ref 7–15)
BUN SERPL-MCNC: 11.3 MG/DL (ref 8–23)
CALCIUM SERPL-MCNC: 8.9 MG/DL (ref 8.8–10.2)
CHLORIDE SERPL-SCNC: 105 MMOL/L (ref 98–107)
CHOLEST SERPL-MCNC: 184 MG/DL
CREAT SERPL-MCNC: 0.5 MG/DL (ref 0.51–0.95)
DEPRECATED HCO3 PLAS-SCNC: 27 MMOL/L (ref 22–29)
EGFRCR SERPLBLD CKD-EPI 2021: >90 ML/MIN/1.73M2
FASTING STATUS PATIENT QL REPORTED: YES
FASTING STATUS PATIENT QL REPORTED: YES
GLUCOSE SERPL-MCNC: 90 MG/DL (ref 70–99)
HDLC SERPL-MCNC: 46 MG/DL
HPV HR 12 DNA CVX QL NAA+PROBE: NEGATIVE
HPV16 DNA CVX QL NAA+PROBE: NEGATIVE
HPV18 DNA CVX QL NAA+PROBE: NEGATIVE
HUMAN PAPILLOMA VIRUS FINAL DIAGNOSIS: NORMAL
LDLC SERPL CALC-MCNC: 121 MG/DL
NONHDLC SERPL-MCNC: 138 MG/DL
POTASSIUM SERPL-SCNC: 3.9 MMOL/L (ref 3.4–5.3)
SODIUM SERPL-SCNC: 142 MMOL/L (ref 135–145)
TRIGL SERPL-MCNC: 84 MG/DL

## 2024-05-30 ENCOUNTER — TELEPHONE (OUTPATIENT)
Dept: FAMILY MEDICINE | Facility: CLINIC | Age: 69
End: 2024-05-30
Payer: COMMERCIAL

## 2024-05-30 PROBLEM — I27.20 PULMONARY HYPERTENSION (H): Status: RESOLVED | Noted: 2022-02-28 | Resolved: 2024-05-30

## 2024-05-30 NOTE — TELEPHONE ENCOUNTER
[facility] Services [phone]   name/ID:   Rex [id number]    Called pt and advised of results note from Samantha Thomas.  Pt would like appt to discuss medication options.  Virtual appt scheduled.  Result letter submitted to translation services (Telephone encounter created for documentation once translated letter mailed).    Iveth Nichols RN, BSN  [facility]

## 2024-05-30 NOTE — LETTER
June 4, 2024      Ping Guerrero  [address] [facility] [address]        Dear ,    We are writing to inform you of your test results.    Dear Ping,     It was a pleasure to see you in clinic. Here are your recent lab results.     Your metabolic panel shows normal electrolytes, kidney function, and blood sugar.     Your cholesterol levels are a little increased compared to your test last year. In short, your overall cardiovascular risk in the next 10 years is 7.9% using all contributory factors including cholesterol numbers, age, sex, smoking status, blood pressure and blood sugar. We typically start talking about cholesterol medications once your cardiovascular risk is 7.5% or greater or if your LDL levels are over 190 as this can help reduce the chance of cardiovascular events such as heart attack or stroke in the future.     At this time, I would recommend considering medication. If you are interested, please schedule a visit (video visit or in person visit) to discuss medication options. You can schedule a visit through [employer] or by calling the clinic at [phone].     If you have any questions or concerns, please do not hesitate to contact me.     Take care,  Samantha Thomas PA-C                        ??? Ping????     ?????????? ??????????????     ???????????????????????????     ????????????????????? ??????????????????????????????????????? 10 ??????????? 7.9%? ???????????? 7.5%????????????? 190?????????????????????????????????????????????????     ????????????? ????????????????????????????????????? ????? MyChart ?????????????? [phone] ?????     ????????????????????     ???  Samantha Thomas?PA-C        Resulted Orders   Lipid panel reflex to direct LDL Fasting   Result Value Ref Range    Cholesterol 184 <200 mg/dL    Triglycerides 84 <150 mg/dL    Direct Measure HDL 46 (L) >=50 mg/dL    LDL Cholesterol Calculated 121 (H) <=100 mg/dL    Non HDL Cholesterol 138 (H) <130 mg/dL    Patient  Fasting > 8hrs? Yes     Narrative    Cholesterol  Desirable:  <200 mg/dL    Triglycerides  Normal:  Less than 150 mg/dL  Borderline High:  150-199 mg/dL  High:  200-499 mg/dL  Very High:  Greater than or equal to 500 mg/dL    Direct Measure HDL  Female:  Greater than or equal to 50 mg/dL   Male:  Greater than or equal to 40 mg/dL    LDL Cholesterol  Desirable:  <100mg/dL  Above Desirable:  100-129 mg/dL   Borderline High:  130-159 mg/dL   High:  160-189 mg/dL   Very High:  >= 190 mg/dL    Non HDL Cholesterol  Desirable:  130 mg/dL  Above Desirable:  130-159 mg/dL  Borderline High:  160-189 mg/dL  High:  190-219 mg/dL  Very High:  Greater than or equal to 220 mg/dL   Basic metabolic panel  (Ca, Cl, CO2, Creat, Gluc, K, Na, BUN)   Result Value Ref Range    Sodium 142 135 - 145 mmol/L      Comment:      Reference intervals for this test were updated on 09/26/2023 to more accurately reflect our healthy population. There may be differences in the flagging of prior results with similar values performed with this method. Interpretation of those prior results can be made in the context of the updated reference intervals.     Potassium 3.9 3.4 - 5.3 mmol/L    Chloride 105 98 - 107 mmol/L    Carbon Dioxide (CO2) 27 22 - 29 mmol/L    Anion Gap 10 7 - 15 mmol/L    Urea Nitrogen 11.3 8.0 - 23.0 mg/dL    Creatinine 0.50 (L) 0.51 - 0.95 mg/dL    GFR Estimate >90 >60 mL/min/1.73m2    Calcium 8.9 8.8 - 10.2 mg/dL    Glucose 90 70 - 99 mg/dL    Patient Fasting > 8hrs? Yes        If you have any questions or concerns, please call the clinic at the number listed above.       Sincerely,    Samantha Thomas PA-C

## 2024-05-30 NOTE — TELEPHONE ENCOUNTER
----- Message from Samantha Thomas PA-C sent at 5/30/2024  8:41 AM CDT -----  Please translate to Mandarin and send results letter.        Dear Ping,     It was a pleasure to see you in clinic. Here are your recent lab results.     Your metabolic panel shows normal electrolytes, kidney function, and blood sugar.    Your cholesterol levels are a little increased compared to your test last year. In short, your overall cardiovascular risk in the next 10 years is 7.9% using all contributory factors including cholesterol numbers, age, sex, smoking status, blood pressure and blood sugar. We typically start talking about cholesterol medications once your cardiovascular risk is 7.5% or greater or if your LDL levels are over 190 as this can help reduce the chance of cardiovascular events such as heart attack or stroke in the future.     At this time, I would recommend considering medication. If you are interested, please schedule a visit (video visit or in person visit) to discuss medication options. You can schedule a visit through [employer] or by calling the clinic at [phone].     If you have any questions or concerns, please do not hesitate to contact me.     Take care,  Samantha Thomas PA-C

## 2024-05-30 NOTE — RESULT ENCOUNTER NOTE
[facility] [phone]   name/ID:   [id number] [id number]    Called pt and advised of results note from Samantha Thomas.  Pt would like appt to discuss medication options.  Virtual appt scheduled.  Result letter submitted to translation services (Telephone encounter created for documentation once translated letter mailed).    Iveth Nichols RN, BSN  [facility]

## 2024-06-03 ENCOUNTER — VIRTUAL VISIT (OUTPATIENT)
Dept: FAMILY MEDICINE | Facility: CLINIC | Age: 69
End: 2024-06-03
Payer: COMMERCIAL

## 2024-06-03 DIAGNOSIS — E78.5 HYPERLIPIDEMIA, UNSPECIFIED HYPERLIPIDEMIA TYPE: Primary | ICD-10-CM

## 2024-06-03 LAB
BKR LAB AP GYN ADEQUACY: NORMAL
BKR LAB AP GYN INTERPRETATION: NORMAL
BKR LAB AP PREVIOUS ABNORMAL: NORMAL
PATH REPORT.COMMENTS IMP SPEC: NORMAL
PATH REPORT.COMMENTS IMP SPEC: NORMAL
PATH REPORT.RELEVANT HX SPEC: NORMAL

## 2024-06-03 PROCEDURE — 99213 OFFICE O/P EST LOW 20 MIN: CPT | Mod: 93 | Performed by: PHYSICIAN ASSISTANT

## 2024-06-03 RX ORDER — ROSUVASTATIN CALCIUM 5 MG/1
5 TABLET, COATED ORAL DAILY
Qty: 90 TABLET | Refills: 0 | Status: SHIPPED | OUTPATIENT
Start: 2024-06-03 | End: 2024-08-06

## 2024-06-03 NOTE — PROGRESS NOTES
Ping is a 68 year old who is being evaluated via a billable telephone visit.    What phone number would you like to be contacted at? [phone]  How would you like to obtain your AVS? Mail a copy  Originating Location (pt. Location): Home    Distant Location (provider location):  On-site    Assessment & Plan     Hyperlipidemia, unspecified hyperlipidemia type  Reviewed ASCVD risk score and lipids. She is agreeable to starting statin. Reviewed r/b/se. Will start rosuvastatin 5 mg daily. Recheck fasting lipids in 2-3 months.  - Lipid panel reflex to direct LDL Fasting; Future  - **ALT FUTURE 2mo; Future  - rosuvastatin (CRESTOR) 5 MG tablet; Take 1 tablet (5 mg) by mouth daily  - Hepatic panel (Albumin, ALT, AST, Bili, Alk Phos, TP); Future          Subjective   Ping is a 68 year old, presenting for the following health issues:  Lipids    HPI     Review recent lipids and ASCVD risk score of 7.9%    The 10-year ASCVD risk score (Ray DK, et al., 2019) is: 7.9%    Values used to calculate the score:      Age: 68 years      Sex: Female      Is Non- : No      Diabetic: No      Tobacco smoker: No      Systolic Blood Pressure: 136 mmHg      Is BP treated: No      HDL Cholesterol: 46 mg/dL      Total Cholesterol: 184 mg/dL     Lab Results   Component Value Date    CHOL 184 05/28/2024    CHOL 164 09/24/2020     Lab Results   Component Value Date    HDL 46 05/28/2024    HDL 51 09/24/2020     Lab Results   Component Value Date     05/28/2024    LDL 80 09/24/2020     Lab Results   Component Value Date    TRIG 84 05/28/2024    TRIG 163 09/24/2020         Objective         Vitals:  No vitals were obtained today due to virtual visit.    Physical Exam   General: Alert and no distress //Respiratory: No audible wheeze, cough, or shortness of breath // Psychiatric:  Appropriate affect, tone, and pace of words        Phone call duration: 11 minutes  Signed Electronically by: Samantha Thomas,  RACHELLE

## 2024-06-04 NOTE — TELEPHONE ENCOUNTER
Translated results letter received from [employer].  Printed copy and mailed to pt.    Iveth Nichols RN, BSN  [facility]

## 2024-06-28 ENCOUNTER — TELEPHONE (OUTPATIENT)
Dept: GASTROENTEROLOGY | Facility: CLINIC | Age: 69
End: 2024-06-28
Payer: COMMERCIAL

## 2024-06-28 DIAGNOSIS — Z12.11 SPECIAL SCREENING FOR MALIGNANT NEOPLASMS, COLON: Primary | ICD-10-CM

## 2024-06-28 NOTE — LETTER
2024      Ping Guerrero  [address] [facility] [address]        Standard Golytely (Colyte, Nulytely) Bowel Prep   Prep instructions for your colonoscopy   For prep questions, please call: [facility] - [address] [place] [address] -  [phone] option 4    Bowel prep was sent 2024 to [facility], [place] - [address] [address]     Please read these instructions carefully at least 7 days prior to your colonoscopy procedure. Be sure to follow all directions completely. The inside of your colon must be clean to allow for a complete examination for the presence of any growths, polyps, and/or abnormalities, as well as their biopsy or removal. A number of tips are included in order to make this part of the procedure as comfortable as possible.    Getting ready    prescription at the pharmacy. Endoscopy team will order this about 2 weeks before to your scheduled procedure. Included in the kit will be the followin Dulcolax (Bisacodyl) 5mg tablets  1 container of Polyethylene Glycol (Golytely, Colyte, Nulytely, Gavilyte-G)    A nurse will call you to go over your appointment details and prep instructions. Not completing the nurse call could result with your appointment being cancelled.    You must arrange for an adult to drive you home after your exam. Your colonoscopy cannot be done unless you have a ride. If you need to use public transportation, someone must ride with you and stay with you for up to 24 hours.       7 days before procedure   Consult with your prescribing provider about stopping any:     Diabetic/Weight Loss Injectable Medication GLP-1 agonist (such as Exenatide (Byetta, Bydureon),  Mounjaro (Tirzepatide).  Ozempic (Semaglutide). Semaglutide. Symlin (Pamlintide), Tanzeum (Albiglutide). Tirzepatide-Weight Management (Zepbound), Trulicity (Dulaglutide), Victoza (Saxenda, Liraglutide), Wegovy (Semaglutide) please follow below guidelines for  holding:    For weekly injection HOLD 7 days before procedure.  For once or twice a day injection HOLD the day before procedure and day of procedure.  For oral, daily dosing (Rybelsus) HOLD 7 days before procedure.    Blood thinning and/or anti platelet medications: such as Coumadin, Plavix, Xarelto, Eliquis, Lovenox or others, these medications may need to be stopped temporarily before your procedure.     If you take insulin for diabetes, ask your prescribing provider for instructions on how to manage this medication while preparing for a colonoscopy.     NSAIDs medications such as Sulindac, Celebrex, Mobic, Relafen or others may need to be stopped before the procedure. This will be discussed during nurse review call or you can reach out to your prescribing provider.     Stop taking iron (ferrous sulfate), multivitamins that contain iron, and/or fiber supplements (Metamucil, Benefiber, Psyllium husk powder, Fibercon, Bran, etc.).      Stop eating whole kernel corn, popcorn, nuts, and foods that contain seeds. These can stay in the colon for many days and they can clog up the colonoscope.       3 days before procedure     Begin a low-fiber diet (see examples below). No Olestra (a fat substitute).    Consume no more than 10-15 grams of fiber each day.     It is important to stay hydrated. Drink at least eight 8-ounce glasses of water a day.      LOW FIBER DIET   You can have:   Do not have:    Starches: White bread, rolls, biscuits, croissants, Milan toast, white flour tortillas, waffles, pancakes, Uzbek toast; white rice, noodles, pasta, macaroni; cooked and peeled potatoes; plain crackers, saltines; cooked farina or cream of rice; puffed rice, corn flakes, Rice Krispies, Special K      Vegetables: tender cooked and canned, vegetable broths     Fruits and fruit juices: Strained fruit juice, canned fruit without seeds or skin (not pineapple), applesauce, pear sauce, ripe bananas, melons (not watermelon)     Milk  products: Milk (plain or flavored), cheese, cottage cheese, yogurt (no berries), custard, ice cream       Proteins: Tender, well-cooked ground beef, lamb, veal, ham, pork, chicken, turkey, fish or organ meat, Tofu, eggs, creamy peanut butter      Fats and condiments:  Margarine, butter, oils, mayonnaise, sour cream, salad dressing, plain gravy; spices, cooked herbs; sugar, clear jelly, honey, syrup      Snacks, sweets and drinks: Pretzels, hard candy; plain cakes and cookies (no nuts or seeds); gelatin, plain pudding, sherbet, Popsicles; coffee, tea, carbonated ( fizzy ) drinks    Starches: Breads or rolls that contain nuts, seeds or fruit; whole wheat or whole grain breads that contain more than 2 grams of fiber per serving; cornbread; corn or whole wheat tortillas; potatoes with skin; brown rice, wild rice, quinoa, kasha (buckwheat), and oatmeal      Vegetables: Any raw or steamed vegetables; vegetables with seeds; corn in any form      Fruits and fruit juices: Prunes, prune juice, raisins and other dried fruits, berries and other fruits with seeds, canned pineapple juices with pulp such as orange, grapefruit, pineapple or tomato juice     Milk products: Any yogurt with nuts, seeds or berries      Proteins: Tough, fibrous meats with gristle; cooked dried beans, peas or lentils; crunchy peanut butter     Fats and condiments: Pickles, olives, relish, horseradish; jam, marmalade, preserves      Snacks, sweets and drinks: Popcorn, nuts, seeds, granola, coconut, candies made with nuts or seeds; all desserts that contain nuts, seeds, raisins and other dried fruits, coconut, whole grains or bran.                                               1 day before procedure     You can have a light, low-fiber breakfast. But drink only clear liquids after 9 a.m. (see list below).    Drink at least eight to ten 8-ounce glasses of water throughout the day. ? ? ? ? ? ? ? ?    Fill the container of Golytely with a full gallon of warm  water. Cover and shake until well mixed. Chill for 3 hours in refrigerator until it is time to drink solution.    Stop taking NSAIDs pain relievers, such as Advil, Ibuprofen, Motrin, etc. You may take Tylenol.    CLEAR LIQUID DIET:  You can have: Do not have:    Water, tea, coffee (no milk or cream)   Soda pop, Gatorade (not red or purple)   Coconut water   Jell-O, Popsicles (no milk or fruit pieces - not red or purple)   Fat-free soup broth or bouillon   Plain hard candy, such as clear life savers (not red or purple)   Clear juices and fruit-flavored drinks, such as apple juice, white grape juice, Hi-C, and Jose Guadalupe-Aid (not red or purple)  Milk or milk products such as ice cream, malts or shakes, or coffee creamer   Red or purple drinks of any kind such as cranberry juice, grape juice or Jose Guadalupe-Aid. Avoid red or purple Jell-O, Popsicles, sorbet, sherbet and candy   Juices with pulp such as orange, grapefruit, pineapple or tomato juice   Cream soups of any kind   Alcohol and beer   Protein drinks or protein powder     Step 1     At 3 PM, take 2 Dulcolax (Bisacodyl) tablets.   At 6 PM, start drinking one 8-ounce glass of Golytely mixture every 15 minutes until the container is HALF empty. Drink each glass quickly. Store the rest in the refrigerator.   Continue to drink clear liquids    Step 2     If you arrive for your procedure BEFORE 11 AM:  At 11 PM, take 2 Dulcolax (Bisacodyl) tablets  At 11 PM, start drinking the other half of the Golytely container. Drink one 8-ounce glass every 15 minutes until the container is empty. Drink each glass quickly.    If you arrive for your procedure AFTER 11 AM:  At 6 AM, take 2 Dulcolax (Bisacodyl) tablets  At 6 AM, start drinking the other half of the Golytely container. Drink one 8-ounce glass of Golytely mixture every 15 minutes until the container is empty. Drink each glass quickly.       Reminders While Drinking Laxatives:     After you start drinking the solution, stay near a  toilet. You may have watery stools (diarrhea), mild cramping, bloating, and nausea. You may want to use Vaseline on the skin around your anus after each bowel movement or use wet wipes to prevent irritation. Bowel movements will be liquid and dark in color at first and then should turn clear yellow in color. Drinking the prepared solution may make you cold, wearing warm clothing can be helpful.    Some find it helpful to:  For added flavor, include a crystal light lemonade packet in each glass of Golytely, rather than mixing it into the entire prepared mixture.   In between each glass, try sucking on a lemon or a piece of hard candy to help diminish the flavor of the preparation.  Drinking from a straw can help minimize the taste of the prepared mixture.    If you have nausea or vomiting during drinking the solution, rinse your mouth with water and take a 15-30 minute break and then continue drinking solution.     Day of procedure     2 hours before your arrival time stop drinking all liquids, including water.   Do not smoke or swallow anything, including water or gum for at least 2 hours before your arrival time. This is a safety issue. Your procedure could be cancelled if you do not follow directions.  No chewing tobacco 6 hours prior to procedure arrival time.     You may take your necessary morning medications with sips of water (4 ounces).   Do not take diabetes medicine by mouth until after your exam.  If you have asthma, bring your inhalers.  Please perform your nebulizer treatments and airway clearance therapy in the morning prior to the procedure (if applicable).    Arrive with a responsible adult who can drive you home and stay with you for up to 24 hours. The medications used during the procedure will make you sleepy, so you won't be able to drive yourself home.   You cannot use public transportation, ride-share services, or non-medical taxi services without a responsible caregiver. Medical transport  services are okay, but a caregiver must be there to receive you at your destination.  Please check in with your  when you arrive. Drivers should stay on campus.    Expect to be at the procedure center for about 1.5-2.5 hours.    Do not wear jewelry (i.e. earrings, rings, necklaces, watches, etc.). Leave your purse, billfold, credit cards, and other valuables at home.      Bring insurance card and ID.       Answers to Commonly Asked Questions     How soon can I eat after the procedure?  You may resume your normal diet when you feel ready, unless advised otherwise by the doctor performing your procedure. We recommend starting with a light meal.   Do not drink alcohol for 24 hours after your procedure.  You may resume normal activities (work, exercise, etc.) after 24 hours.    How will I feel after the procedure?  It is normal to feel bloated and gassy after your procedure. Walking will help move the air through your colon. You can take non-aspirin pain relievers that contain acetaminophen (Tylenol).  If you are having sedation, we require a responsible adult to take you home for your safety. The sedation medicines used to relax you during the procedure can impair your judgement and reaction time, and make you forgetful and possibly a little unsteady.  Do not drive, make any important decisions, or sign any legal documents for 24 hours after your procedure.    When will I get my test results?  You should have your procedure results and any lab results (if applicable) by letter, MyChart message, or phone call within 2 weeks. If you have any questions, please call the doctor that referred you for the procedure.    How do I know if my colon is cleaned out?   After completing the bowel prep, your bowel movements should be all liquid and yellow. Your bowel movements will look similar to urine in the toilet. If there are pieces of stool (poop) in the toilet, or if you can't see to the bottom of the toilet, please call  our office for advice. Call [phone] and ask to speak with a nurse.    Why is the Golytely bowel prep taken in several steps?   The stool is flushed out by a large wave of fluid going through the colon. Just sipping a large volume of the solution will not achieve the desired result. Studies have shown that two smaller waves (or more in some cases) are better than one large one.      What if I need to cancel or reschedule my procedure?  Contact our endoscopy scheduling team at [phone], option 2. Monday through Friday, 7:00am-5:00pm.

## 2024-06-28 NOTE — TELEPHONE ENCOUNTER
"Endoscopy Scheduling Screen    Have you had a positive Covid test in the last 14 days?  No    What is your communication preference for Instructions and/or Bowel Prep?   Mail/USPS    What insurance is in the chart?  Other:  [facility]    Ordering/Referring Provider: ANUJ HIGHTOWER    (If ordering provider performs procedure, schedule with ordering provider unless otherwise instructed. )    BMI: Estimated body mass index is 27.06 kg/m  as calculated from the following:    Height as of 5/28/24: 1.499 m (4' 11\").    Weight as of 5/28/24: 60.8 kg (134 lb).     Sedation Ordered  moderate sedation.   If patient BMI > 50 do not schedule in [facility].    If patient BMI > 45 do not schedule at [facility].    Are you taking methadone or Suboxone?  No    Have you had difficulties, pain, or discomfort during past endoscopy procedures?  No    Are you taking any prescription medications for pain 3 or more times per week?   NO, No RN review required.    Do you have a history of malignant hyperthermia?  No    (Females) Are you currently pregnant?   No     Have you been diagnosed or told you have pulmonary hypertension?   No    Do you have an LVAD?  No    Have you been told you have moderate to severe sleep apnea?  No    Have you been told you have COPD, asthma, or any other lung disease?  No    Do you have any heart conditions?  No     Have you ever had or are you waiting for an organ transplant?  No. Continue scheduling, no site restrictions.    Have you had a stroke or transient ischemic attack (TIA aka \"mini stroke\" in the last 6 months?   No    Have you been diagnosed with or been told you have cirrhosis of the liver?   No    Are you currently on dialysis?   No    Do you need assistance transferring?   Yes (Hospital Only)    BMI: Estimated body mass index is 27.06 kg/m  as calculated from the following:    Height as of 5/28/24: 1.499 m (4' 11\").    Weight as of 5/28/24: 60.8 kg (134 lb).     Is patients BMI > 40 and scheduling location " [facility]?  No    Do you take an injectable medication for weight loss or diabetes (excluding insulin)?  No    Do you take the medication Naltrexone?  No    Do you take blood thinners?  No       Prep   Are you currently on dialysis or do you have chronic kidney disease?  No    Do you have a diagnosis of diabetes?  No    Do you have a diagnosis of cystic fibrosis (CF)?  No    On a regular basis do you go 3 -5 days between bowel movements?  No    BMI > 40?  No    Preferred Pharmacy:    [facility] [id number] [facility]  [address] [facility] [address]  Phone: [phone] Fax: [phone]      Final Scheduling Details     Procedure scheduled  Colonoscopy    Surgeon:  VEENA     Date of procedure:  8/27/24     Pre-OP / PAC:   No - Not required for this site.    Location  [facility] - Patient preference.    Sedation   Moderate Sedation - Per order.      Patient Reminders:   You will receive a call from a Nurse to review instructions and health history.  This assessment must be completed prior to your procedure.  Failure to complete the Nurse assessment may result in the procedure being cancelled.      On the day of your procedure, please designate an adult(s) who can drive you home stay with you for the next 24 hours. The medicines used in the exam will make you sleepy. You will not be able to drive.      You cannot take public transportation, ride share services, or non-medical taxi service without a responsible caregiver.  Medical transport services are allowed with the requirement that a responsible caregiver will receive you at your destination.  We require that drivers and caregivers are confirmed prior to your procedure.

## 2024-07-02 ENCOUNTER — TELEPHONE (OUTPATIENT)
Dept: FAMILY MEDICINE | Facility: CLINIC | Age: 69
End: 2024-07-02

## 2024-07-02 ENCOUNTER — ALLIED HEALTH/NURSE VISIT (OUTPATIENT)
Dept: FAMILY MEDICINE | Facility: CLINIC | Age: 69
End: 2024-07-02
Attending: PHYSICIAN ASSISTANT
Payer: COMMERCIAL

## 2024-07-02 VITALS — SYSTOLIC BLOOD PRESSURE: 118 MMHG | HEART RATE: 52 BPM | DIASTOLIC BLOOD PRESSURE: 76 MMHG

## 2024-07-02 DIAGNOSIS — R03.0 ELEVATED BP WITHOUT DIAGNOSIS OF HYPERTENSION: Primary | ICD-10-CM

## 2024-07-02 PROCEDURE — 99207 PR NO CHARGE NURSE ONLY: CPT

## 2024-07-02 NOTE — TELEPHONE ENCOUNTER
Ping Guerrero is a 68 year old patient who comes in today for a Blood Pressure check.  Initial BP:  /76 (BP Location: Right arm, Patient Position: Sitting, Cuff Size: Adult Large)   Pulse 52      52  Disposition: results routed to provider    BP Readings from Last 3 Encounters:   07/02/24 118/76   05/28/24 136/80   09/19/23 139/72        Claudette HAIRSTON MA

## 2024-08-06 ENCOUNTER — LAB (OUTPATIENT)
Dept: LAB | Facility: CLINIC | Age: 69
End: 2024-08-06
Payer: COMMERCIAL

## 2024-08-06 ENCOUNTER — TELEPHONE (OUTPATIENT)
Dept: FAMILY MEDICINE | Facility: CLINIC | Age: 69
End: 2024-08-06

## 2024-08-06 ENCOUNTER — NURSE TRIAGE (OUTPATIENT)
Dept: NURSING | Facility: CLINIC | Age: 69
End: 2024-08-06

## 2024-08-06 DIAGNOSIS — E78.5 HYPERLIPIDEMIA, UNSPECIFIED HYPERLIPIDEMIA TYPE: ICD-10-CM

## 2024-08-06 LAB
ALBUMIN SERPL BCG-MCNC: 3.9 G/DL (ref 3.5–5.2)
ALP SERPL-CCNC: 61 U/L (ref 40–150)
ALT SERPL W P-5'-P-CCNC: <5 U/L (ref 0–50)
AST SERPL W P-5'-P-CCNC: 17 U/L (ref 0–45)
BILIRUB DIRECT SERPL-MCNC: 0.26 MG/DL (ref 0–0.3)
BILIRUB SERPL-MCNC: 1.2 MG/DL
CHOLEST SERPL-MCNC: 113 MG/DL
FASTING STATUS PATIENT QL REPORTED: YES
HDLC SERPL-MCNC: 48 MG/DL
LDLC SERPL CALC-MCNC: 46 MG/DL
NONHDLC SERPL-MCNC: 65 MG/DL
PROT SERPL-MCNC: 7 G/DL (ref 6.4–8.3)
TRIGL SERPL-MCNC: 93 MG/DL

## 2024-08-06 PROCEDURE — 80061 LIPID PANEL: CPT

## 2024-08-06 PROCEDURE — 80076 HEPATIC FUNCTION PANEL: CPT

## 2024-08-06 PROCEDURE — 36415 COLL VENOUS BLD VENIPUNCTURE: CPT

## 2024-08-06 RX ORDER — ROSUVASTATIN CALCIUM 5 MG/1
5 TABLET, COATED ORAL DAILY
Qty: 90 TABLET | Refills: 3 | Status: SHIPPED | OUTPATIENT
Start: 2024-08-06

## 2024-08-06 RX ORDER — BISACODYL 5 MG/1
TABLET, DELAYED RELEASE ORAL
Qty: 4 TABLET | Refills: 0 | Status: ON HOLD | OUTPATIENT
Start: 2024-08-06 | End: 2024-08-27

## 2024-08-06 NOTE — TELEPHONE ENCOUNTER
Nurse Triage SBAR    Situation: Call back to clinic    Background:   -Patient calling  -It is okay to call back and leave a detailed message at this number:       Assessment: Pt asking for lab result.  Received a message from clinic to call back. Read message from clinic staff to pt. No triage.     Mandarin  used for this call.   Reason for Disposition    [1] Follow-up call to recent contact AND [2] information only call, no triage required    Protocols used: Information Only Call - No Triage-A-

## 2024-08-06 NOTE — TELEPHONE ENCOUNTER
Standard Golytely Bowel Prep recommended due to language barrier.  Instructions were sent via letter. Bowel prep was [facility] 8/6/2024 [facility] [place] [facility] [address] [address]

## 2024-08-06 NOTE — TELEPHONE ENCOUNTER
----- Message from Samantha Thomas sent at 8/6/2024  4:30 PM CDT -----  Please call with results (needs Mandarin )    Your cholesterol levels are improved in the last few months after starting rosuvastatin. Plan to continue with same medication. I sent in refills for you.    Thanks!  Samantha Thomas PA-C

## 2024-08-08 NOTE — TELEPHONE ENCOUNTER
See other telephone encounter from 8/6. Pt was notified of results.    Claudette Grace RN on 8/8/2024 at 1:26 PM

## 2024-08-16 ENCOUNTER — TELEPHONE (OUTPATIENT)
Dept: GASTROENTEROLOGY | Facility: CLINIC | Age: 69
End: 2024-08-16
Payer: COMMERCIAL

## 2024-08-16 NOTE — TELEPHONE ENCOUNTER
Pre visit planning completed.      Procedure details:    Patient scheduled for Colonoscopy on 08.27.2024.     Arrival time: 0945. Procedure time 1030    Facility location: [facility]; [address] [place] [address]. Check in location: Main entrance, door #1 on the North side of the building under roundabout awning. DO NOT GO TO SURGERY/ED ENTRANCE.     Sedation type: Conscious sedation     Pre op exam needed? No.    Indication for procedure: Screen for colon cancer       Chart review:     Electronic implanted devices? No    Recent diagnosis of diverticulitis within the last 6 weeks? No    Discuss with patient needing assistance transferring that was noted on telephone screening questions.     Medication review:    Diabetic? No    Anticoagulants? No    Weight loss medication/injectable? No GLP-1 medication per patient's medication list.  RN will verify with pre-assessment call.    NSAIDS? No NSAID medications per patient's medication list.  RN will verify with pre-assessment call.    Other medication HOLDING recommendations:  N/A      Prep for procedure:     Bowel prep recommendation: Standard Golytely. Bowel prep prescription sent to [facility] [id number]  Due to: language barrier.     Prep instructions sent via letter by CRC team          Mariella Granados RN  Endoscopy Procedure Pre Assessment RN  [phone] option 4

## 2024-08-19 NOTE — TELEPHONE ENCOUNTER
Pre assessment completed for upcoming procedure with the assistance of Mandarin  ID [id number].   (Please see previous telephone encounter notes for complete details)      Procedure details:    Arrival time and facility location reviewed.    Pre op exam needed? No.    Designated  policy reviewed. Instructed to have someone stay 6  hours post procedure.       Medication review:    Medications reviewed. Please see supporting documentation below. Holding recommendations discussed (if applicable).       Prep for procedure:     Procedure prep instructions reviewed.        Any additional information needed:  Patient does not need any assistance transferring      Patient  verbalized understanding and had no questions or concerns at this time.      Cheli Woods RN  Endoscopy Procedure Pre Assessment   [phone] option 4

## 2024-08-27 ENCOUNTER — HOSPITAL ENCOUNTER (OUTPATIENT)
Facility: CLINIC | Age: 69
Discharge: HOME | End: 2024-08-27
Attending: COLON & RECTAL SURGERY | Admitting: COLON & RECTAL SURGERY
Payer: COMMERCIAL

## 2024-08-27 VITALS
HEIGHT: 59 IN | DIASTOLIC BLOOD PRESSURE: 67 MMHG | RESPIRATION RATE: 16 BRPM | BODY MASS INDEX: 26.21 KG/M2 | OXYGEN SATURATION: 98 % | WEIGHT: 130 LBS | HEART RATE: 55 BPM | SYSTOLIC BLOOD PRESSURE: 103 MMHG

## 2024-08-27 LAB — COLONOSCOPY: NORMAL

## 2024-08-27 PROCEDURE — 99153 MOD SED SAME PHYS/QHP EA: CPT | Performed by: COLON & RECTAL SURGERY

## 2024-08-27 PROCEDURE — 250N000011 HC RX IP 250 OP 636: Performed by: COLON & RECTAL SURGERY

## 2024-08-27 PROCEDURE — G0500 MOD SEDAT ENDO SERVICE >5YRS: HCPCS | Performed by: COLON & RECTAL SURGERY

## 2024-08-27 PROCEDURE — 45378 DIAGNOSTIC COLONOSCOPY: CPT | Performed by: COLON & RECTAL SURGERY

## 2024-08-27 PROCEDURE — G0121 COLON CA SCRN NOT HI RSK IND: HCPCS | Performed by: COLON & RECTAL SURGERY

## 2024-08-27 RX ORDER — ONDANSETRON 2 MG/ML
4 INJECTION INTRAMUSCULAR; INTRAVENOUS
Status: DISCONTINUED | OUTPATIENT
Start: 2024-08-27 | End: 2024-08-27 | Stop reason: HOSPADM

## 2024-08-27 RX ORDER — NALOXONE HYDROCHLORIDE 0.4 MG/ML
0.4 INJECTION, SOLUTION INTRAMUSCULAR; INTRAVENOUS; SUBCUTANEOUS
Status: DISCONTINUED | OUTPATIENT
Start: 2024-08-27 | End: 2024-08-27 | Stop reason: HOSPADM

## 2024-08-27 RX ORDER — NALOXONE HYDROCHLORIDE 0.4 MG/ML
0.4 INJECTION, SOLUTION INTRAMUSCULAR; INTRAVENOUS; SUBCUTANEOUS
Status: CANCELLED | OUTPATIENT
Start: 2024-08-27

## 2024-08-27 RX ORDER — ONDANSETRON 2 MG/ML
4 INJECTION INTRAMUSCULAR; INTRAVENOUS EVERY 6 HOURS PRN
Status: CANCELLED | OUTPATIENT
Start: 2024-08-27

## 2024-08-27 RX ORDER — ATROPINE SULFATE 0.1 MG/ML
1 INJECTION INTRAVENOUS
Status: DISCONTINUED | OUTPATIENT
Start: 2024-08-27 | End: 2024-08-27 | Stop reason: HOSPADM

## 2024-08-27 RX ORDER — FLUMAZENIL 0.1 MG/ML
0.2 INJECTION, SOLUTION INTRAVENOUS
Status: CANCELLED | OUTPATIENT
Start: 2024-08-27 | End: 2024-08-27

## 2024-08-27 RX ORDER — DIPHENHYDRAMINE HYDROCHLORIDE 50 MG/ML
25-50 INJECTION INTRAMUSCULAR; INTRAVENOUS
Status: DISCONTINUED | OUTPATIENT
Start: 2024-08-27 | End: 2024-08-27 | Stop reason: HOSPADM

## 2024-08-27 RX ORDER — PROCHLORPERAZINE MALEATE 5 MG
5 TABLET ORAL EVERY 6 HOURS PRN
Status: CANCELLED | OUTPATIENT
Start: 2024-08-27

## 2024-08-27 RX ORDER — NALOXONE HYDROCHLORIDE 0.4 MG/ML
0.2 INJECTION, SOLUTION INTRAMUSCULAR; INTRAVENOUS; SUBCUTANEOUS
Status: DISCONTINUED | OUTPATIENT
Start: 2024-08-27 | End: 2024-08-27 | Stop reason: HOSPADM

## 2024-08-27 RX ORDER — EPINEPHRINE 1 MG/ML
0.1 INJECTION, SOLUTION INTRAMUSCULAR; SUBCUTANEOUS
Status: DISCONTINUED | OUTPATIENT
Start: 2024-08-27 | End: 2024-08-27 | Stop reason: HOSPADM

## 2024-08-27 RX ORDER — ONDANSETRON 4 MG/1
4 TABLET, ORALLY DISINTEGRATING ORAL EVERY 6 HOURS PRN
Status: CANCELLED | OUTPATIENT
Start: 2024-08-27

## 2024-08-27 RX ORDER — NALOXONE HYDROCHLORIDE 0.4 MG/ML
0.2 INJECTION, SOLUTION INTRAMUSCULAR; INTRAVENOUS; SUBCUTANEOUS
Status: CANCELLED | OUTPATIENT
Start: 2024-08-27

## 2024-08-27 RX ORDER — SIMETHICONE 40MG/0.6ML
133 SUSPENSION, DROPS(FINAL DOSAGE FORM)(ML) ORAL
Status: DISCONTINUED | OUTPATIENT
Start: 2024-08-27 | End: 2024-08-27 | Stop reason: HOSPADM

## 2024-08-27 RX ORDER — LIDOCAINE 40 MG/G
CREAM TOPICAL
Status: DISCONTINUED | OUTPATIENT
Start: 2024-08-27 | End: 2024-08-27 | Stop reason: HOSPADM

## 2024-08-27 RX ORDER — FENTANYL CITRATE 50 UG/ML
50-100 INJECTION, SOLUTION INTRAMUSCULAR; INTRAVENOUS EVERY 5 MIN PRN
Status: DISCONTINUED | OUTPATIENT
Start: 2024-08-27 | End: 2024-08-27 | Stop reason: HOSPADM

## 2024-08-27 RX ORDER — FLUMAZENIL 0.1 MG/ML
0.2 INJECTION, SOLUTION INTRAVENOUS
Status: DISCONTINUED | OUTPATIENT
Start: 2024-08-27 | End: 2024-08-27 | Stop reason: HOSPADM

## 2024-08-27 RX ADMIN — FENTANYL CITRATE 100 MCG: 50 INJECTION, SOLUTION INTRAMUSCULAR; INTRAVENOUS at 10:39

## 2024-08-27 RX ADMIN — MIDAZOLAM 1 MG: 1 INJECTION INTRAMUSCULAR; INTRAVENOUS at 10:39

## 2024-08-27 RX ADMIN — MIDAZOLAM 1 MG: 1 INJECTION INTRAMUSCULAR; INTRAVENOUS at 10:47

## 2024-08-27 ASSESSMENT — ACTIVITIES OF DAILY LIVING (ADL)
ADLS_ACUITY_SCORE: 35
ADLS_ACUITY_SCORE: 35

## 2024-08-27 NOTE — H&P
"Pre-Endoscopy History and Physical     Ping Guerrero MRN# [id number]   YOB: 1955 Age: 68 year old     Date of Procedure: 8/27/2024  Primary care provider: Ashely Fregoso  Type of Endoscopy: colonoscopy  Reason for Procedure: screening  Type of Anesthesia Anticipated: Moderate Sedation    HPI:    Ping is a 68 year old female who will be undergoing the above procedure.      A history and physical has been performed. The patient's medications and allergies have been reviewed. The risks and benefits of the procedure and the sedation options and risks were discussed with the patient.  All questions were answered and informed consent was obtained.      She denies a personal or family history of anesthesia complications or bleeding disorders.     No Known Allergies     Prior to Admission Medications   Prescriptions Last Dose Informant Patient Reported? Taking?   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 Per notes: \"I do not think this lady has pulmonary hypertension or warrants additional testing.  Her RVSP is only mildly elevated, systemic blood pressure is normal, she does not have any other diseases, and right ventricular systolic function is normal.\"          Past Surgical History:   Procedure " "Laterality Date    CATARACT IOL, RT/LT Bilateral 02/2020    HC ECP WITH CATARACT SURGERY Right 03/15/2019    In [place]       Social History     Tobacco Use    Smoking status: Never    Smokeless tobacco: Never   Substance Use Topics    Alcohol use: Yes     Comment: occassionally       Family History   Problem Relation Age of Onset    No Known Problems Mother         intestinal blockage    No Known Problems Father     Colon Cancer No family hx of        REVIEW OF SYSTEMS:     5 point ROS negative except as noted above in HPI, including Gen., Resp., CV, GI &  system review.      PHYSICAL EXAM:   Ht 1.499 m (4' 11\")   Wt 59 kg (130 lb)   BMI 26.26 kg/m   Estimated body mass index is 26.26 kg/m  as calculated from the following:    Height as of this encounter: 1.499 m (4' 11\").    Weight as of this encounter: 59 kg (130 lb).   GENERAL APPEARANCE: healthy and alert  MENTAL STATUS: alert  AIRWAY EXAM: Mallampatti Class II (visualization of the soft palate, fauces, and uvula)  RESP: lungs clear to auscultation - no rales, rhonchi or wheezes  CV: regular rates and rhythm      DIAGNOSTICS:    Not indicated      IMPRESSION   ASA Class 2 - Mild systemic disease        PLAN:       Plan for colonoscopy. We discussed the risks, benefits and alternatives and the patient wished to proceed.    The above has been forwarded to the consulting provider.      Signed Electronically by: Janneth Foster MD, MD  August 27, 2024    "

## 2024-09-15 ENCOUNTER — APPOINTMENT (OUTPATIENT)
Dept: GENERAL RADIOLOGY | Facility: CLINIC | Age: 69
End: 2024-09-15
Attending: EMERGENCY MEDICINE
Payer: COMMERCIAL

## 2024-09-15 ENCOUNTER — HOSPITAL ENCOUNTER (EMERGENCY)
Facility: CLINIC | Age: 69
Discharge: HOME | End: 2024-09-15
Attending: EMERGENCY MEDICINE | Admitting: EMERGENCY MEDICINE
Payer: COMMERCIAL

## 2024-09-15 VITALS
TEMPERATURE: 98.1 F | HEART RATE: 67 BPM | WEIGHT: 132.5 LBS | HEIGHT: 59 IN | BODY MASS INDEX: 26.71 KG/M2 | DIASTOLIC BLOOD PRESSURE: 72 MMHG | OXYGEN SATURATION: 96 % | RESPIRATION RATE: 18 BRPM | SYSTOLIC BLOOD PRESSURE: 129 MMHG

## 2024-09-15 DIAGNOSIS — S43.004A SHOULDER DISLOCATION, RIGHT, INITIAL ENCOUNTER: ICD-10-CM

## 2024-09-15 PROCEDURE — 96374 THER/PROPH/DIAG INJ IV PUSH: CPT

## 2024-09-15 PROCEDURE — 250N000011 HC RX IP 250 OP 636: Performed by: EMERGENCY MEDICINE

## 2024-09-15 PROCEDURE — 73060 X-RAY EXAM OF HUMERUS: CPT | Mod: RT

## 2024-09-15 PROCEDURE — 96375 TX/PRO/DX INJ NEW DRUG ADDON: CPT

## 2024-09-15 PROCEDURE — 99285 EMERGENCY DEPT VISIT HI MDM: CPT | Mod: 25

## 2024-09-15 PROCEDURE — 73030 X-RAY EXAM OF SHOULDER: CPT | Mod: RT

## 2024-09-15 PROCEDURE — 250N000013 HC RX MED GY IP 250 OP 250 PS 637: Performed by: EMERGENCY MEDICINE

## 2024-09-15 PROCEDURE — 73020 X-RAY EXAM OF SHOULDER: CPT | Mod: RT

## 2024-09-15 PROCEDURE — 23650 CLTX SHO DSLC W/MNPJ WO ANES: CPT | Mod: RT

## 2024-09-15 RX ORDER — FENTANYL CITRATE 50 UG/ML
75 INJECTION, SOLUTION INTRAMUSCULAR; INTRAVENOUS ONCE
Status: COMPLETED | OUTPATIENT
Start: 2024-09-15 | End: 2024-09-15

## 2024-09-15 RX ORDER — FLUMAZENIL 0.1 MG/ML
0.2 INJECTION, SOLUTION INTRAVENOUS
Status: DISCONTINUED | OUTPATIENT
Start: 2024-09-15 | End: 2024-09-15 | Stop reason: HOSPADM

## 2024-09-15 RX ORDER — IBUPROFEN 600 MG/1
600 TABLET, FILM COATED ORAL ONCE
Status: COMPLETED | OUTPATIENT
Start: 2024-09-15 | End: 2024-09-15

## 2024-09-15 RX ORDER — HYDROCODONE BITARTRATE AND ACETAMINOPHEN 5; 325 MG/1; MG/1
1 TABLET ORAL EVERY 6 HOURS PRN
Qty: 8 TABLET | Refills: 0 | Status: SHIPPED | OUTPATIENT
Start: 2024-09-15

## 2024-09-15 RX ORDER — PROPOFOL 10 MG/ML
0.25 INJECTION, EMULSION INTRAVENOUS
Status: DISCONTINUED | OUTPATIENT
Start: 2024-09-15 | End: 2024-09-15 | Stop reason: HOSPADM

## 2024-09-15 RX ORDER — PROPOFOL 10 MG/ML
40 INJECTION, EMULSION INTRAVENOUS ONCE
Status: COMPLETED | OUTPATIENT
Start: 2024-09-15 | End: 2024-09-15

## 2024-09-15 RX ADMIN — IBUPROFEN 600 MG: 600 TABLET ORAL at 13:17

## 2024-09-15 RX ADMIN — FENTANYL CITRATE 75 MCG: 50 INJECTION INTRAMUSCULAR; INTRAVENOUS at 13:46

## 2024-09-15 RX ADMIN — PROPOFOL 60 MG: 10 INJECTION, EMULSION INTRAVENOUS at 14:33

## 2024-09-15 ASSESSMENT — COLUMBIA-SUICIDE SEVERITY RATING SCALE - C-SSRS
1. IN THE PAST MONTH, HAVE YOU WISHED YOU WERE DEAD OR WISHED YOU COULD GO TO SLEEP AND NOT WAKE UP?: NO
6. HAVE YOU EVER DONE ANYTHING, STARTED TO DO ANYTHING, OR PREPARED TO DO ANYTHING TO END YOUR LIFE?: NO
2. HAVE YOU ACTUALLY HAD ANY THOUGHTS OF KILLING YOURSELF IN THE PAST MONTH?: NO

## 2024-09-15 ASSESSMENT — ACTIVITIES OF DAILY LIVING (ADL)
ADLS_ACUITY_SCORE: 35
ADLS_ACUITY_SCORE: 35

## 2024-09-15 NOTE — SEDATION DOCUMENTATION
Assisted with reduction of R shoulder. Pt given 60 mg of IV propofol. Reduction successful. Portable shows in place. Shoulder immobilizer placed. PT is VSS and ABC itnact.

## 2024-09-15 NOTE — ED TRIAGE NOTES
Patient's grandson accidentally tripped her causing her to fall, patient is now having pain in her right upper arm. Patient indicates the pain is in her upper arm and into her armpit.       Triage Assessment (Adult)       Row Name 09/15/24 1108          Triage Assessment    Airway WDL WDL        Respiratory WDL    Respiratory WDL WDL        Peripheral/Neurovascular WDL    Peripheral Neurovascular WDL WDL

## 2024-09-15 NOTE — ED PROVIDER NOTES
Emergency Department Note      History of Present Illness     Chief Complaint   Fall      ESCOBAR Guerrero is a 68 year old female presents with right shoulder pain.  Patient's daughter is interpreting for the patient.  Patient tripped on grandson's leg causing her to fall forward with her arms outstretched.  She did not strike her head or lose consciousness.  She had immediate onset of right shoulder pain since that time.  Pain has been severe, nonradiating aggravated by any movement.  She has no prior history of fracture or dislocation.  No associated numbness or weakness.    Independent Historian   Daughter as noted above    Review of External Notes   None    Past Medical History     Medical History and Problem List   Past Medical History:   Diagnosis Date    Nonsenile cataract 03/2019    Pulmonary hypertension - mild (H) - she does not have pulmonary HTN per cardiology 02/28/2022       Medications   HYDROcodone-acetaminophen (NORCO) 5-325 MG tablet  Calcium Carb-Cholecalciferol (CALCIUM + VITAMIN D3) 500-5 MG-MCG TABS  Multiple Vitamin (MULTIVITAMIN PO)  rosuvastatin (CRESTOR) 5 MG tablet        Surgical History   Past Surgical History:   Procedure Laterality Date    CATARACT IOL, RT/LT Bilateral 02/2020    COLONOSCOPY N/A 8/27/2024    Procedure: Colonoscopy;  Surgeon: Janneth Foster MD;  Location: [facility] WITH CATARACT SURGERY Right 03/15/2019    [place]       Physical Exam     Patient Vitals for the past 24 hrs:   BP Temp Temp src Pulse Resp SpO2 Height Weight   09/15/24 1539 -- -- -- 67 18 96 % -- --   09/15/24 1538 129/72 -- -- 70 -- 92 % -- --   09/15/24 1447 119/78 -- -- 68 18 93 % -- --   09/15/24 1445 -- -- -- 67 -- 96 % -- --   09/15/24 1443 118/69 -- -- 62 18 97 % -- --   09/15/24 1432 -- -- -- -- 20 -- -- --   09/15/24 1430 -- -- -- 72 -- 99 % -- --   09/15/24 1430 (!) 150/76 -- -- -- -- -- -- --   09/15/24 1423 137/75 -- -- 71 20 97 % -- --   09/15/24 1341 -- -- -- -- -- 97 % --  "--   09/15/24 1340 131/63 -- -- 64 -- -- -- --   09/15/24 1110 133/74 98.1  F (36.7  C) Oral 59 18 99 % 1.499 m (4' 11\") 60.1 kg (132 lb 7.9 oz)     Physical Exam    HEENT:    Oropharynx is moist  EYES:    Conjunctiva normal.  NECK:    Supple, no meningismus.   CV:     Regular rate and rhythm     No murmurs, rubs or gallops.       2+ radial pulses bilateral.  PULM:    Clear to auscultation bilateral.       No respiratory distress.  ABD:    Soft, non-tender, non-distended.       No rebound or guarding.  MSK:     Right upper extremity:      Deformity to the glenohumeral joint consistent with anterior glenohumeral dislocation.      Severe pain with palpation and severely limited ROM of the shoulder.      No effusion, warmth or overlying defect of the skin at the shoulder.      No tenderness or deformity to th elbow and wrist; full passive ROM.  LYMPH:   No cervical lymphadenopathy.  NEURO:   Right upper extremity:      Axillary, median, radial and ulnar nerve intact to motor and sensation.  SKIN:    Warm, dry and intact.       No rash.      Diagnostics     Lab Results   Labs Ordered and Resulted from Time of ED Arrival to Time of ED Departure - No data to display    Imaging   Shoulder XR, right, one vw PORTABLE   Final Result   IMPRESSION: Interval relocation of the glenohumeral joint, now normally aligned. No definite fracture visualized on this single view. Demineralization. Mild degenerative arthrosis at the glenohumeral joint. Normal AC joint.      Humerus XR, G/E 2 views, right   Final Result   IMPRESSION: Acute anterior glenohumeral dislocation. Possible but not definite associated greater tuberosity fracture of the proximal humerus, recommend attention to this on follow-up. No glenoid or other fracture visualized. Large subacromial    enthesophyte incidentally noted. Normal joint alignment at the acromioclavicular and elbow joints.      XR Shoulder Right 2 Views   Final Result   IMPRESSION: Acute anterior " glenohumeral dislocation. Possible but not definite associated greater tuberosity fracture of the proximal humerus, recommend attention to this on follow-up. No glenoid or other fracture visualized. Large subacromial    enthesophyte incidentally noted. Normal joint alignment at the acromioclavicular and elbow joints.          Independent Interpretation   X-ray Right shoulder shows anterior glenohumeral dislocation.  Postreduction films reveal adequate alignment.    ED Course      Medications Administered   Medications   ibuprofen (ADVIL/MOTRIN) tablet 600 mg (600 mg Oral $Given 9/15/24 1317)   fentaNYL (PF) (SUBLIMAZE) injection 75 mcg (75 mcg Intravenous $Given 9/15/24 1346)   propofol (DIPRIVAN) injection 10 mg/mL vial (60 mg Intravenous $Given 9/15/24 1433)       Procedures   [facility]    -Dislocation - Upper Extremity    Date/Time: 9/15/2024 5:52 PM    Performed by: Can Jones MD  Authorized by: Can Jones MD    Risks, benefits and alternatives discussed.    ED EVALUATION:      I have performed an Emergency Department Evaluation including taking a history and physical examination, this evaluation will be documented in the electronic medical record for this ED encounter.      ASA Class: Class 1- healthy patient    Mallampati: Grade 2- soft palate, base of uvula, tonsillar pillars, and portion of posterior pharyngeal wall visible    UNIVERSAL PROTOCOL   Site Marked: No  Prior Images Obtained and Reviewed:  Yes  Required items: Required blood products, implants, devices and special equipment available    Patient identity confirmed:  Verbally with patient, arm band and provided demographic data  Patient was reevaluated immediately before administering moderate or deep sedation or anesthesia  Confirmation Checklist:  Patient's identity using two indicators, relevant allergies, procedure was appropriate and matched the consent or emergent situation and correct  equipment/implants were available  Time out: Immediately prior to the procedure a time out was called    Universal Protocol: the Joint Commission Universal Protocol was followed      LOCATION     Location:  Shoulder    Shoulder location:  R shoulder    Shoulder dislocation type: anterior      PRE PROCEDURE ASSESSMENT      Pre-procedure imaging:  X-ray    Imaging findings: dislocation present      Imaging findings: no fracture      SEDATION  Patient Sedated: Yes    Sedation Type:  Deep  Sedation:  Propofol  Vital signs: Vital signs monitored during sedation      ANESTHESIA (see MAR for exact dosages)      Anesthesia method:  None    PROCEDURE DETAILS      Manipulation performed: yes      Shoulder reduction method:  Traction and counter traction    Reduction successful: yes      Reduction confirmed with imaging: yes      POST PROCEDURE DETAILS     Neurological function: normal      Distal perfusion: normal      Range of motion: improved        PROCEDURE    Length of time physician/provider present for 1:1 monitoring during sedation: 10       Discussion of Management   None    ED Course        Additional Documentation  None    Medical Decision Making / Diagnosis         KAREN Guerrero is a 68 year old female presents with acute traumatic right shoulder pain.  X-ray reveals anterior inferior glenohumeral dislocation.  She was unable to be reduced at bedside with IV fentanyl and required conscious sedation with propofol.  Patient underwent closed reduction which was successful.  Postreduction films are without fracture and now in anatomic alignment.  No evidence of associated neurovascular injury.  Patient placed in a shoulder immobilizer.  She was discharged home with limited supply of analgesics, close follow-up with orthopedic surgery and return to ED for worsening symptoms.    Disposition   The patient was discharged.     Diagnosis     ICD-10-CM    1. Shoulder dislocation, right, initial encounter  S43.004A             Discharge Medications   Discharge Medication List as of 9/15/2024  3:35 PM        START taking these medications    Details   HYDROcodone-acetaminophen (NORCO) 5-325 MG tablet Take 1 tablet by mouth every 6 hours as needed for pain., Disp-8 tablet, R-0, E-Prescribe               MD Robert Peralta Jeremiah R, MD  09/15/24 3297

## 2024-09-17 ENCOUNTER — TRANSFERRED RECORDS (OUTPATIENT)
Dept: HEALTH INFORMATION MANAGEMENT | Facility: CLINIC | Age: 69
End: 2024-09-17
Payer: COMMERCIAL

## 2024-10-29 ENCOUNTER — TRANSFERRED RECORDS (OUTPATIENT)
Dept: HEALTH INFORMATION MANAGEMENT | Facility: CLINIC | Age: 69
End: 2024-10-29
Payer: COMMERCIAL

## 2024-12-26 ENCOUNTER — PATIENT OUTREACH (OUTPATIENT)
Dept: CARE COORDINATION | Facility: CLINIC | Age: 69
End: 2024-12-26
Payer: COMMERCIAL

## 2025-06-03 ENCOUNTER — OFFICE VISIT (OUTPATIENT)
Dept: FAMILY MEDICINE | Facility: CLINIC | Age: 70
End: 2025-06-03
Attending: PHYSICIAN ASSISTANT
Payer: COMMERCIAL

## 2025-06-03 VITALS
HEART RATE: 54 BPM | OXYGEN SATURATION: 98 % | BODY MASS INDEX: 25.62 KG/M2 | SYSTOLIC BLOOD PRESSURE: 150 MMHG | DIASTOLIC BLOOD PRESSURE: 84 MMHG | HEIGHT: 59 IN | TEMPERATURE: 98 F | RESPIRATION RATE: 16 BRPM | WEIGHT: 127.1 LBS

## 2025-06-03 DIAGNOSIS — I71.21 ANEURYSM OF ASCENDING AORTA WITHOUT RUPTURE: ICD-10-CM

## 2025-06-03 DIAGNOSIS — E78.5 HYPERLIPIDEMIA, UNSPECIFIED HYPERLIPIDEMIA TYPE: ICD-10-CM

## 2025-06-03 DIAGNOSIS — Z12.31 VISIT FOR SCREENING MAMMOGRAM: Primary | ICD-10-CM

## 2025-06-03 DIAGNOSIS — Z00.00 ROUTINE GENERAL MEDICAL EXAMINATION AT A HEALTH CARE FACILITY: ICD-10-CM

## 2025-06-03 DIAGNOSIS — R03.0 ELEVATED BP WITHOUT DIAGNOSIS OF HYPERTENSION: ICD-10-CM

## 2025-06-03 LAB
ANION GAP SERPL CALCULATED.3IONS-SCNC: 11 MMOL/L (ref 7–15)
BUN SERPL-MCNC: 18.6 MG/DL (ref 8–23)
CALCIUM SERPL-MCNC: 8.6 MG/DL (ref 8.8–10.4)
CHLORIDE SERPL-SCNC: 108 MMOL/L (ref 98–107)
CHOLEST SERPL-MCNC: 193 MG/DL
CREAT SERPL-MCNC: 0.5 MG/DL (ref 0.51–0.95)
EGFRCR SERPLBLD CKD-EPI 2021: >90 ML/MIN/1.73M2
ERYTHROCYTE [DISTWIDTH] IN BLOOD BY AUTOMATED COUNT: 12.4 % (ref 10–15)
FASTING STATUS PATIENT QL REPORTED: YES
FASTING STATUS PATIENT QL REPORTED: YES
GLUCOSE SERPL-MCNC: 89 MG/DL (ref 70–99)
HCO3 SERPL-SCNC: 25 MMOL/L (ref 22–29)
HCT VFR BLD AUTO: 34.8 % (ref 35–47)
HDLC SERPL-MCNC: 45 MG/DL
HGB BLD-MCNC: 11.7 G/DL (ref 11.7–15.7)
LDLC SERPL CALC-MCNC: 129 MG/DL
MCH RBC QN AUTO: 31 PG (ref 26.5–33)
MCHC RBC AUTO-ENTMCNC: 33.6 G/DL (ref 31.5–36.5)
MCV RBC AUTO: 92 FL (ref 78–100)
NONHDLC SERPL-MCNC: 148 MG/DL
PLATELET # BLD AUTO: 197 10E3/UL (ref 150–450)
POTASSIUM SERPL-SCNC: 4.1 MMOL/L (ref 3.4–5.3)
RBC # BLD AUTO: 3.78 10E6/UL (ref 3.8–5.2)
SODIUM SERPL-SCNC: 144 MMOL/L (ref 135–145)
TRIGL SERPL-MCNC: 94 MG/DL
WBC # BLD AUTO: 4.9 10E3/UL (ref 4–11)

## 2025-06-03 RX ORDER — ROSUVASTATIN CALCIUM 5 MG/1
5 TABLET, COATED ORAL DAILY
Qty: 90 TABLET | Refills: 3 | Status: SHIPPED | OUTPATIENT
Start: 2025-06-03

## 2025-06-03 SDOH — HEALTH STABILITY: PHYSICAL HEALTH: ON AVERAGE, HOW MANY DAYS PER WEEK DO YOU ENGAGE IN MODERATE TO STRENUOUS EXERCISE (LIKE A BRISK WALK)?: 1 DAY

## 2025-06-03 SDOH — HEALTH STABILITY: PHYSICAL HEALTH: ON AVERAGE, HOW MANY MINUTES DO YOU ENGAGE IN EXERCISE AT THIS LEVEL?: 10 MIN

## 2025-06-03 ASSESSMENT — SOCIAL DETERMINANTS OF HEALTH (SDOH): HOW OFTEN DO YOU GET TOGETHER WITH FRIENDS OR RELATIVES?: MORE THAN THREE TIMES A WEEK

## 2025-06-03 NOTE — PATIENT INSTRUCTIONS
Patient Education   Preventive Care Advice   This is general advice given by our system to help you stay healthy. However, your care team may have specific advice just for you. Please talk to your care team about your preventive care needs.  Nutrition  Eat 5 or more servings of fruits and vegetables each day.  Try wheat bread, brown rice and whole grain pasta (instead of white bread, rice, and pasta).  Get enough calcium and vitamin D. Check the label on foods and aim for 100% of the RDA (recommended daily allowance).  Lifestyle  Exercise at least 150 minutes each week  (30 minutes a day, 5 days a week).  Do muscle strengthening activities 2 days a week. These help control your weight and prevent disease.  No smoking.  Wear sunscreen to prevent skin cancer.  Have a dental exam and cleaning every 6 months.  Yearly exams  See your health care team every year to talk about:  Any changes in your health.  Any medicines your care team has prescribed.  Preventive care, family planning, and ways to prevent chronic diseases.  Shots (vaccines)   HPV shots (up to age 26), if you've never had them before.  Hepatitis B shots (up to age 59), if you've never had them before.  COVID-19 shot: Get this shot when it's due.  Flu shot: Get a flu shot every year.  Tetanus shot: Get a tetanus shot every 10 years.  Pneumococcal, hepatitis A, and RSV shots: Ask your care team if you need these based on your risk.  Shingles shot (for age 50 and up)  General health tests  Diabetes screening:  Starting at age 35, Get screened for diabetes at least every 3 years.  If you are younger than age 35, ask your care team if you should be screened for diabetes.  Cholesterol test: At age 39, start having a cholesterol test every 5 years, or more often if advised.  Bone density scan (DEXA): At age 50, ask your care team if you should have this scan for osteoporosis (brittle bones).  Hepatitis C: Get tested at least once in your life.  STIs (sexually  transmitted infections)  Before age 24: Ask your care team if you should be screened for STIs.  After age 24: Get screened for STIs if you're at risk. You are at risk for STIs (including HIV) if:  You are sexually active with more than one person.  You don't use condoms every time.  You or a partner was diagnosed with a sexually transmitted infection.  If you are at risk for HIV, ask about PrEP medicine to prevent HIV.  Get tested for HIV at least once in your life, whether you are at risk for HIV or not.  Cancer screening tests  Cervical cancer screening: If you have a cervix, begin getting regular cervical cancer screening tests starting at age 21.  Breast cancer scan (mammogram): If you've ever had breasts, begin having regular mammograms starting at age 40. This is a scan to check for breast cancer.  Colon cancer screening: It is important to start screening for colon cancer at age 45.  Have a colonoscopy test every 10 years (or more often if you're at risk) Or, ask your provider about stool tests like a FIT test every year or Cologuard test every 3 years.  To learn more about your testing options, visit:   .  For help making a decision, visit:   https://bit.ly/gf83106.  Prostate cancer screening test: If you have a prostate, ask your care team if a prostate cancer screening test (PSA) at age 55 is right for you.  Lung cancer screening: If you are a current or former smoker ages 50 to 80, ask your care team if ongoing lung cancer screenings are right for you.  For informational purposes only. Not to replace the advice of your health care provider. Copyright   2023 [employer]. All rights reserved. Clinically reviewed by the [facility] Transitions Program. [employer] 633958 - REV 01/24.

## 2025-06-03 NOTE — PROGRESS NOTES
"Preventive Care Visit  [facility] MIRNA Cassidy CNP, Family Practice  Chet 3, 2025      Assessment & Plan     Visit for screening mammogram  - MA Screening Bilateral w/ Anthony; Future    Aneurysm of ascending aorta without rupture  Due for echo and cardiology follow up in September.  Reminded pt and daughter.    Hyperlipidemia, unspecified hyperlipidemia type  Would like to check lipids before restarting.  - Lipid panel reflex to direct LDL Fasting; Future  - Basic metabolic panel  (Ca, Cl, CO2, Creat, Gluc, K, Na, BUN); Future  - rosuvastatin (CRESTOR) 5 MG tablet; Take 1 tablet (5 mg) by mouth daily.    Routine general medical examination at a health care facility  - CBC with platelets; Future    Elevated BP  Asymptomatic.  Will recheck with nurse in 2 weeks.  If still elevated will talk about medication.    The longitudinal plan of care for the diagnosis(es)/condition(s) as documented were addressed during this visit. Due to the added complexity in care, I will continue to support Ping in the subsequent management and with ongoing continuity of care.          BMI  Estimated body mass index is 25.67 kg/m  as calculated from the following:    Height as of this encounter: 1.499 m (4' 11\").    Weight as of this encounter: 57.7 kg (127 lb 1.6 oz).       Counseling  Appropriate preventive services were addressed with this patient via screening, questionnaire, or discussion as appropriate for fall prevention, nutrition, physical activity, Tobacco-use cessation, social engagement, weight loss and cognition.  Checklist reviewing preventive services available has been given to the patient.  Reviewed patient's diet, addressing concerns and/or questions.   She is at risk for lack of exercise and has been provided with information to increase physical activity for the benefit of her well-being.       Jeannie Feng is a 69 year old, presenting for the following:  Physical        " 6/3/2025    10:42 AM   Additional Questions   Roomed by MR   Accompanied by Daughter         6/3/2025    10:42 AM   Patient Reported Additional Medications   Patient reports taking the following new medications NA          HPI     DECLINES COVID VACCINE  PT IS FASTING    Ran out of crestor about 4 months ago.  Forgot to tell daughter she needed a refill so stopped completely.  Had been tolerating without problem.    No cardiac or respiratory symptoms.        Advance Care Planning    Discussed advance care planning with patient; however, patient declined at this time.  Sent with paperwork.        6/3/2025   General Health   How would you rate your overall physical health? Good   Feel stress (tense, anxious, or unable to sleep) Not at all         6/3/2025   Nutrition   Diet: Regular (no restrictions)         6/3/2025   Exercise   Days per week of moderate/strenous exercise 1 day   Average minutes spent exercising at this level 10 min   (!) EXERCISE CONCERN      6/3/2025   Social Factors   Frequency of gathering with friends or relatives More than three times a week   Worry food won't last until get money to buy more No   Food not last or not have enough money for food? No   Do you have housing? (Housing is defined as stable permanent housing and does not include staying outside in a car, in a tent, in an abandoned building, in an overnight shelter, or couch-surfing.) Yes   Are you worried about losing your housing? No   Lack of transportation? No   Unable to get utilities (heat,electricity)? No         6/3/2025   Fall Risk   Fallen 2 or more times in the past year? No   Trouble with walking or balance? No          6/3/2025   Activities of Daily Living- Home Safety   Needs help with the following daily activites None of the above   Safety concerns in the home None of the above         6/3/2025   Dental   Dentist two times every year? (!) DECLINE         6/3/2025   Hearing Screening   Hearing concerns? None of the above          6/3/2025   Driving Risk Screening   Patient/family members have concerns about driving No         6/3/2025   General Alertness/Fatigue Screening   Have you been more tired than usual lately? No         6/3/2025   Urinary Incontinence Screening   Bothered by leaking urine in past 6 months No         Today's PHQ-2 Score:       6/3/2025     9:27 AM   PHQ-2 ( 1999 Pf[employer])   Q1: Little interest or pleasure in doing things 0   Q2: Feeling down, depressed or hopeless 0   PHQ-2 Score 0    Q1: Little interest or pleasure in doing things Not at all   Q2: Feeling down, depressed or hopeless Not at all   PHQ-2 Score 0       Patient-reported           6/3/2025   Substance Use   Alcohol more than 3/day or more than 7/wk No   Do you have a current opioid prescription? No   How severe/bad is pain from 1 to 10? 0/10 (No Pain)   Do you use any other substances recreationally? No     Social History     Tobacco Use    Smoking status: Never    Smokeless tobacco: Never   Vaping Use    Vaping status: Never Used   Substance Use Topics    Alcohol use: Yes     Comment: occassionally    Drug use: Never           6/19/2023   LAST FHS-7 RESULTS   1st degree relative breast or ovarian cancer No   Any relative bilateral breast cancer No   Any male have breast cancer No   Any ONE woman have BOTH breast AND ovarian cancer No   Any woman with breast cancer before 50yrs No   2 or more relatives with breast AND/OR ovarian cancer No   2 or more relatives with breast AND/OR bowel cancer No        Mammogram ordered.      History of abnormal Pap smear: No - age 65 or older with adequate negative prior screening test results (3 consecutive negative cytology results, 2 consecutive negative cotesting results, or 2 consecutive negative HrHPV test results within 10 years, with the most recent test occurring within the recommended screening interval for the test used)        Latest Ref Rng & Units 5/28/2024    11:26 AM 9/24/2020     8:40 AM 9/24/2020      8:38 AM   PAP / HPV   PAP  Negative for Intraepithelial Lesion or Malignancy (NILM)      PAP (Historical)    NIL    HPV 16 DNA Negative Negative  Negative     HPV 18 DNA Negative Negative  Negative     Other HR HPV Negative Negative  Negative       ASCVD Risk   The ASCVD Risk score (Ray BRENNER, et al., 2019) failed to calculate for the following reasons:    The valid total cholesterol range is 130 to 320 mg/dL            Reviewed and updated as needed this visit by Provider                    Patient Active Problem List   Diagnosis    Aneurysm of ascending aorta without rupture    Hyperlipidemia, unspecified hyperlipidemia type     Past Surgical History:   Procedure Laterality Date    CATARACT IOL, RT/LT Bilateral 02/2020    COLONOSCOPY N/A 8/27/2024    Procedure: Colonoscopy;  Surgeon: Janneth Foster MD;  Location:  GI    HC ECP WITH CATARACT SURGERY Right 03/15/2019    [place]       Social History     Tobacco Use    Smoking status: Never    Smokeless tobacco: Never   Substance Use Topics    Alcohol use: Yes     Comment: occassionally     Family History   Problem Relation Age of Onset    No Known Problems Mother         intestinal blockage    No Known Problems Father     Colon Cancer No family hx of          Current providers sharing in care for this patient include:  Patient Care Team:  Clinic - [facility] as PCP - Samantha Mathis PA-C as Assigned PCP    The following health maintenance items are reviewed in Epic and correct as of today:  Health Maintenance   Topic Date Due    COVID-19 VACCINE (7 - 2024-25 season) 09/01/2024    ANNUAL REVIEW OF HM ORDERS  05/28/2025    MEDICARE ANNUAL WELLNESS VISIT  05/28/2025    MAMMO SCREENING  06/19/2025    LIPID  08/06/2025    INFLUENZA VACCINE (Season Ended) 09/01/2025    FALL RISK ASSESSMENT  06/03/2026    DIABETES SCREENING  05/28/2027    ADVANCE CARE PLANNING  06/03/2030    DTAP/TDAP/TD VACCINE (2 - Td or Tdap) 09/24/2030     "RSV VACCINE (1 - 1-dose 75+ series) 11/09/2031    DEXA  03/15/2032    COLORECTAL CANCER SCREENING  08/27/2034    HEPATITIS C SCREENING  Completed    PHQ-2 (once per calendar year)  Completed    PAP FOLLOW-UP  Completed    HPV FOLLOW-UP  Completed    PNEUMOCOCCAL VACCINE 50+ YEARS  Completed    ZOSTER VACCINE  Completed    HPV VACCINE  Aged Out    MENINGITIS VACCINE  Aged Out    PAP  Discontinued         Review of Systems  Constitutional, HEENT, cardiovascular, pulmonary, gi and gu systems are negative, except as otherwise noted.     Objective    Exam  BP (!) 150/84 (BP Location: Right arm, Patient Position: Sitting, Cuff Size: Adult Regular)   Pulse (!) 49   Temp 98  F (36.7  C) (Oral)   Resp 16   Ht 1.499 m (4' 11\")   Wt 57.7 kg (127 lb 1.6 oz)   LMP  (LMP Unknown)   SpO2 98%   BMI 25.67 kg/m     Estimated body mass index is 25.67 kg/m  as calculated from the following:    Height as of this encounter: 1.499 m (4' 11\").    Weight as of this encounter: 57.7 kg (127 lb 1.6 oz).    Physical Exam  GENERAL: alert and no distress  EYES: Eyes grossly normal to inspection  HENT: ear canals and TM's normal, nose and mouth without ulcers or lesions  NECK: no adenopathy, no asymmetry, masses, or scars  RESP: lungs clear to auscultation - no rales, rhonchi or wheezes  CV: regular rate and rhythm, normal S1 S2, no S3 or S4, no murmur, click or rub, no peripheral edema  ABDOMEN: soft, nontender, no hepatosplenomegaly, no masses and bowel sounds normal  SKIN: no suspicious lesions or rashes  NEURO: Normal strength and tone, mentation intact and speech normal  PSYCH: mentation appears normal, affect normal/bright        5/28/2024   Mini Cog   Clock Draw Score 2 Normal   3 Item Recall 3 objects recalled   Mini Cog Total Score 5             Signed Electronically by: MIRNA Solorzano CNP    "

## 2025-06-05 ENCOUNTER — RESULTS FOLLOW-UP (OUTPATIENT)
Dept: FAMILY MEDICINE | Facility: CLINIC | Age: 70
End: 2025-06-05
Payer: COMMERCIAL

## 2025-06-05 NOTE — TELEPHONE ENCOUNTER
Called patient with Mandarin , left voicemail, and asked patient to call back. Attempt # 1.     Saskia Ballard RN 6/5/2025  [facility]

## 2025-06-05 NOTE — TELEPHONE ENCOUNTER
Patient and patient's daughter returned call. Reviewed results and provider recommendation via Mandarin . Patient verbalized understanding and denies questions.     Saskia Ballard RN   [facility]

## 2025-06-10 ENCOUNTER — ANCILLARY PROCEDURE (OUTPATIENT)
Dept: MAMMOGRAPHY | Facility: CLINIC | Age: 70
End: 2025-06-10
Attending: NURSE PRACTITIONER
Payer: COMMERCIAL

## 2025-06-10 DIAGNOSIS — Z12.31 VISIT FOR SCREENING MAMMOGRAM: ICD-10-CM

## 2025-06-10 PROCEDURE — 77063 BREAST TOMOSYNTHESIS BI: CPT | Mod: TC | Performed by: STUDENT IN AN ORGANIZED HEALTH CARE EDUCATION/TRAINING PROGRAM

## 2025-06-10 PROCEDURE — 77067 SCR MAMMO BI INCL CAD: CPT | Mod: TC | Performed by: STUDENT IN AN ORGANIZED HEALTH CARE EDUCATION/TRAINING PROGRAM

## 2025-06-17 ENCOUNTER — TELEPHONE (OUTPATIENT)
Dept: FAMILY MEDICINE | Facility: CLINIC | Age: 70
End: 2025-06-17

## 2025-06-17 ENCOUNTER — ALLIED HEALTH/NURSE VISIT (OUTPATIENT)
Dept: FAMILY MEDICINE | Facility: CLINIC | Age: 70
End: 2025-06-17
Payer: COMMERCIAL

## 2025-06-17 VITALS — HEART RATE: 72 BPM | DIASTOLIC BLOOD PRESSURE: 68 MMHG | SYSTOLIC BLOOD PRESSURE: 103 MMHG | OXYGEN SATURATION: 95 %

## 2025-06-17 DIAGNOSIS — Z78.0 ASYMPTOMATIC POSTMENOPAUSAL STATUS: Primary | ICD-10-CM

## 2025-06-17 DIAGNOSIS — Z01.30 BP CHECK: Primary | ICD-10-CM

## 2025-06-17 PROCEDURE — 3074F SYST BP LT 130 MM HG: CPT

## 2025-06-17 PROCEDURE — 3078F DIAST BP <80 MM HG: CPT

## 2025-06-17 PROCEDURE — 99207 PR NO CHARGE NURSE ONLY: CPT

## 2025-06-17 RX ORDER — MULTIVITAMIN
1 TABLET ORAL DAILY
Qty: 90 TABLET | Refills: 1 | Status: SHIPPED | OUTPATIENT
Start: 2025-06-17

## 2025-06-17 NOTE — TELEPHONE ENCOUNTER
Ping Guerrero is a 69 year old patient who comes in today for a Blood Pressure check.  Initial BP:  /68   Pulse 72   LMP  (LMP Unknown)   SpO2 95%      Data Unavailable  Disposition: results routed to provider  Taking medication as directed. No concerns.    Eva NEGRETE

## 2025-06-17 NOTE — TELEPHONE ENCOUNTER
Pts dtr Julisa calls back.  Advised of below.  She wants the multivitamin if possible.  She has been taking this, but she ran out.      Will forward to Latricia Luther for advisal.      Can you call dtr if filled? [phone] - Julisa? Or let her know either way?  Thanks!

## 2025-06-17 NOTE — TELEPHONE ENCOUNTER
[facility] [employer]  Mandarin  [place]   [place] [id number]    Called patient, left voicemail for call back to any triage nurse.     Iveth Nichols, RN on 6/17/2025 at 2:10 PM

## 2025-06-17 NOTE — PROGRESS NOTES
Ping Guerrero is a 69 year old patient who comes in today for a Blood Pressure check.  Initial BP:  /68   Pulse 72   LMP  (LMP Unknown)   SpO2 95%      Data Unavailable  Disposition: results routed to provider  Taking medication as directed. No concerns.    Eva NEGRETE

## (undated) DEVICE — KIT ENDO TURNOVER/PROCEDURE W/CLEAN A SCOPE LINERS 103888

## (undated) RX ORDER — FENTANYL CITRATE 50 UG/ML
INJECTION, SOLUTION INTRAMUSCULAR; INTRAVENOUS
Status: DISPENSED
Start: 2024-08-27

## (undated) RX ORDER — SIMETHICONE 40MG/0.6ML
SUSPENSION, DROPS(FINAL DOSAGE FORM)(ML) ORAL
Status: DISPENSED
Start: 2024-08-27